# Patient Record
Sex: FEMALE | Race: WHITE | NOT HISPANIC OR LATINO | Employment: OTHER | RURAL
[De-identification: names, ages, dates, MRNs, and addresses within clinical notes are randomized per-mention and may not be internally consistent; named-entity substitution may affect disease eponyms.]

---

## 2019-02-19 ENCOUNTER — CLINICAL SUPPORT (OUTPATIENT)
Dept: FAMILY MEDICINE CLINIC | Facility: CLINIC | Age: 69
End: 2019-02-19

## 2019-02-19 DIAGNOSIS — E78.5 HYPERLIPIDEMIA, UNSPECIFIED HYPERLIPIDEMIA TYPE: Primary | ICD-10-CM

## 2019-02-27 ENCOUNTER — TELEPHONE (OUTPATIENT)
Dept: FAMILY MEDICINE CLINIC | Facility: CLINIC | Age: 69
End: 2019-02-27

## 2019-03-05 ENCOUNTER — OFFICE VISIT (OUTPATIENT)
Dept: FAMILY MEDICINE CLINIC | Facility: CLINIC | Age: 69
End: 2019-03-05

## 2019-03-05 VITALS
OXYGEN SATURATION: 97 % | BODY MASS INDEX: 29.85 KG/M2 | RESPIRATION RATE: 18 BRPM | DIASTOLIC BLOOD PRESSURE: 87 MMHG | HEART RATE: 84 BPM | WEIGHT: 179.2 LBS | SYSTOLIC BLOOD PRESSURE: 142 MMHG | HEIGHT: 65 IN | TEMPERATURE: 96.8 F

## 2019-03-05 DIAGNOSIS — H01.005 BLEPHARITIS OF LEFT LOWER EYELID, UNSPECIFIED TYPE: ICD-10-CM

## 2019-03-05 DIAGNOSIS — H10.13 ALLERGIC CONJUNCTIVITIS OF BOTH EYES: ICD-10-CM

## 2019-03-05 DIAGNOSIS — E78.5 HYPERLIPIDEMIA WITH TARGET LDL LESS THAN 100: Primary | ICD-10-CM

## 2019-03-05 PROCEDURE — 99213 OFFICE O/P EST LOW 20 MIN: CPT | Performed by: NURSE PRACTITIONER

## 2019-03-05 RX ORDER — FLUTICASONE PROPIONATE 50 MCG
2 SPRAY, SUSPENSION (ML) NASAL AS NEEDED
COMMUNITY

## 2019-03-05 RX ORDER — IBUPROFEN 200 MG
200 TABLET ORAL EVERY 6 HOURS PRN
COMMUNITY

## 2019-03-05 RX ORDER — OLOPATADINE HYDROCHLORIDE 1 MG/ML
1 SOLUTION/ DROPS OPHTHALMIC 2 TIMES DAILY
Qty: 5 ML | Refills: 5 | Status: SHIPPED | OUTPATIENT
Start: 2019-03-05 | End: 2019-03-08 | Stop reason: ALTCHOICE

## 2019-03-05 NOTE — PROGRESS NOTES
Chief Complaint   Patient presents with   • Hyperlipidemia     Follow-up - Labs        Subjective   Aide Maxwell is a 68 y.o.  female who presents today for follow up cholesterol level.    HPI:  Patient presents today to discuss her recent lipid panel.  She states she has been feeling well except for seasonal allergy symptoms.  She has been treated for same over the past few years with Bepreve.  She states it can be a little too strong at times. Otherwise, she has no complaints.    Aide Maxwell  has a past medical history of Allergic.    No Known Allergies    Current Outpatient Medications:   •  Fexofenadine HCl (ALLEGRA ALLERGY PO), Take 1 tablet by mouth As Needed., Disp: , Rfl:   •  fluticasone (FLONASE) 50 MCG/ACT nasal spray, 2 sprays into the nostril(s) as directed by provider As Needed for Rhinitis., Disp: , Rfl:   •  ibuprofen (ADVIL,MOTRIN) 200 MG tablet, Take 200 mg by mouth Every 6 (Six) Hours As Needed for Mild Pain ., Disp: , Rfl:   Past Medical History:   Diagnosis Date   • Allergic      Past Surgical History:   Procedure Laterality Date   • BREAST LUMPECTOMY Right    • KNEE SURGERY Left    • THYROIDECTOMY, PARTIAL Right    • TUBAL ABDOMINAL LIGATION       Social History     Socioeconomic History   • Marital status:      Spouse name: Not on file   • Number of children: Not on file   • Years of education: Not on file   • Highest education level: Not on file   Tobacco Use   • Smoking status: Never Smoker   • Smokeless tobacco: Never Used   Substance and Sexual Activity   • Alcohol use: Yes     Comment: socially.   • Drug use: No   • Sexual activity: Defer     Family History   Problem Relation Age of Onset   • Dementia Mother    • Heart disease Father    • Leukemia Brother    • Prostate cancer Brother        Family history, surgical history, past medical history, Allergies and med's reviewed with patient today and updated in [facility] EMR.     ROS:  Review of Systems   Constitutional: Negative.   "  HENT: Negative.    Eyes: Positive for itching.   Respiratory: Negative.    Cardiovascular: Negative.    Gastrointestinal: Negative.    Endocrine: Negative.    Genitourinary: Negative.    Musculoskeletal: Negative.    Skin: Negative.    Allergic/Immunologic: Positive for environmental allergies.   Neurological: Negative.    Hematological: Negative.    Psychiatric/Behavioral: Negative.        OBJECTIVE:  Vitals:    03/05/19 1332   BP: 142/87   BP Location: Left arm   Patient Position: Sitting   Cuff Size: Adult   Pulse: 84   Resp: 18   Temp: 96.8 °F (36 °C)   TempSrc: Tympanic   SpO2: 97%   Weight: 81.3 kg (179 lb 3.2 oz)   Height: 165.1 cm (65\")     Physical Exam   Constitutional: She is oriented to person, place, and time. She appears well-developed and well-nourished.   HENT:   Head: Normocephalic and atraumatic.   Right Ear: External ear normal.   Left Ear: External ear normal.   Nose: Nose normal.   Mouth/Throat: Oropharynx is clear and moist.   Eyes: EOM are normal. Pupils are equal, round, and reactive to light.   Mild conjunctival injection with watery tearing.  Irritation of the left inner blephari   Neck: Normal range of motion. Neck supple.   Cardiovascular: Normal rate, regular rhythm, normal heart sounds and intact distal pulses.   Pulmonary/Chest: Effort normal and breath sounds normal.   Abdominal: Soft. Bowel sounds are normal.   Musculoskeletal: Normal range of motion.   Neurological: She is alert and oriented to person, place, and time.   Skin: Skin is warm and dry. Capillary refill takes less than 2 seconds.   Psychiatric: She has a normal mood and affect. Her behavior is normal. Judgment and thought content normal.   Nursing note and vitals reviewed.      ASSESSMENT/ PLAN:    Aide was seen today for hyperlipidemia.    Diagnoses and all orders for this visit:    Hyperlipidemia with target LDL less than 100    Blepharitis of left lower eyelid, unspecified type  -     Neomycin-Polymyxin-Dexameth 0.1 " % ointment; Apply 1 application to eye(s) as directed by provider 2 (Two) Times a Day As Needed (eyelid irritation).    Allergic conjunctivitis of both eyes  -     olopatadine (PATANOL) 0.1 % ophthalmic solution; Administer 1 drop to both eyes 2 (Two) Times a Day.        Orders Placed Today:     New Medications Ordered This Visit   Medications   • olopatadine (PATANOL) 0.1 % ophthalmic solution     Sig: Administer 1 drop to both eyes 2 (Two) Times a Day.     Dispense:  5 mL     Refill:  5   • Neomycin-Polymyxin-Dexameth 0.1 % ointment     Sig: Apply 1 application to eye(s) as directed by provider 2 (Two) Times a Day As Needed (eyelid irritation).     Dispense:  3.5 g     Refill:  5        Management Plan:     An After Visit Summary was printed and given to the patient at discharge.    Follow-up: Return in about 6 months (around 9/5/2019) for Annual physical with labs prior.    ALEXIS Hunter 3/5/2019 2:27 PM  This note was electronically signed.

## 2019-03-08 RX ORDER — OLOPATADINE HYDROCHLORIDE 2 MG/ML
1 SOLUTION/ DROPS OPHTHALMIC DAILY
Qty: 2.5 ML | Refills: 1 | Status: SHIPPED | OUTPATIENT
Start: 2019-03-08 | End: 2020-08-21

## 2019-07-23 ENCOUNTER — TELEPHONE (OUTPATIENT)
Dept: FAMILY MEDICINE CLINIC | Facility: CLINIC | Age: 69
End: 2019-07-23

## 2019-07-23 DIAGNOSIS — H01.005 BLEPHARITIS OF LEFT LOWER EYELID, UNSPECIFIED TYPE: ICD-10-CM

## 2019-07-23 NOTE — TELEPHONE ENCOUNTER
Pt called regarding eczema outbreak. She is requesting cream be sent to [facility] for this. Please call.

## 2019-07-25 RX ORDER — TRIAMCINOLONE ACETONIDE 1 MG/G
CREAM TOPICAL 2 TIMES DAILY
Qty: 45 G | Refills: 1 | Status: SHIPPED | OUTPATIENT
Start: 2019-07-25 | End: 2020-08-21

## 2019-08-06 ENCOUNTER — CLINICAL SUPPORT (OUTPATIENT)
Dept: FAMILY MEDICINE CLINIC | Facility: CLINIC | Age: 69
End: 2019-08-06

## 2019-08-06 DIAGNOSIS — E78.5 HYPERLIPIDEMIA WITH TARGET LDL LESS THAN 100: ICD-10-CM

## 2019-08-06 DIAGNOSIS — Z00.00 MEDICARE ANNUAL WELLNESS VISIT, SUBSEQUENT: Primary | ICD-10-CM

## 2019-08-09 ENCOUNTER — OFFICE VISIT (OUTPATIENT)
Dept: FAMILY MEDICINE CLINIC | Facility: CLINIC | Age: 69
End: 2019-08-09

## 2019-08-09 VITALS
WEIGHT: 177.2 LBS | SYSTOLIC BLOOD PRESSURE: 147 MMHG | HEART RATE: 90 BPM | DIASTOLIC BLOOD PRESSURE: 91 MMHG | HEIGHT: 65 IN | BODY MASS INDEX: 29.52 KG/M2 | RESPIRATION RATE: 14 BRPM

## 2019-08-09 DIAGNOSIS — R03.0 SITUATIONAL HYPERTENSION: ICD-10-CM

## 2019-08-09 DIAGNOSIS — Z12.31 ENCOUNTER FOR SCREENING MAMMOGRAM FOR BREAST CANCER: ICD-10-CM

## 2019-08-09 DIAGNOSIS — Z12.11 COLON CANCER SCREENING: ICD-10-CM

## 2019-08-09 DIAGNOSIS — Z71.3 ENCOUNTER FOR NUTRITIONAL COUNSELING: ICD-10-CM

## 2019-08-09 DIAGNOSIS — H53.9 VISUAL CHANGES: ICD-10-CM

## 2019-08-09 DIAGNOSIS — Z00.00 ENCOUNTER FOR MEDICARE ANNUAL WELLNESS EXAM: Primary | ICD-10-CM

## 2019-08-09 DIAGNOSIS — L30.9 ECZEMA, UNSPECIFIED TYPE: ICD-10-CM

## 2019-08-09 DIAGNOSIS — Z78.0 POST-MENOPAUSAL: ICD-10-CM

## 2019-08-09 PROCEDURE — G0439 PPPS, SUBSEQ VISIT: HCPCS | Performed by: NURSE PRACTITIONER

## 2019-08-09 RX ORDER — HYDROCORTISONE VALERATE CREAM 2 MG/G
CREAM TOPICAL 2 TIMES DAILY
Qty: 15 G | Refills: 2 | Status: SHIPPED | OUTPATIENT
Start: 2019-08-09 | End: 2020-08-21 | Stop reason: SDUPTHER

## 2019-08-09 NOTE — PROGRESS NOTES
The ABCs of the Annual Wellness Visit  Subsequent Medicare Wellness Visit    Chief Complaint   Patient presents with   • Medicare Wellness-subsequent       Subjective   History of Present Illness:  Aide Maxwell is a 68 y.o. female who presents for a Subsequent Medicare Wellness Visit.    HEALTH RISK ASSESSMENT    Recent Hospitalizations:  No hospitalization(s) within the last year.    Current Medical Providers:  Patient Care Team:  Brent Lopez MD as PCP - General (Family Medicine)    Smoking Status:  Social History     Tobacco Use   Smoking Status Former Smoker   Smokeless Tobacco Never Used       Alcohol Consumption:  Social History     Substance and Sexual Activity   Alcohol Use Yes    Comment: socially.       Depression Screen:   PHQ-2/PHQ-9 Depression Screening 8/9/2019   Little interest or pleasure in doing things 0   Feeling down, depressed, or hopeless 0   Trouble falling or staying asleep, or sleeping too much 0   Feeling tired or having little energy 0   Poor appetite or overeating 0   Feeling bad about yourself - or that you are a failure or have let yourself or your family down 0   Trouble concentrating on things, such as reading the newspaper or watching television 0   Moving or speaking so slowly that other people could have noticed. Or the opposite - being so fidgety or restless that you have been moving around a lot more than usual 0   Thoughts that you would be better off dead, or of hurting yourself in some way 0   Total Score 0       Fall Risk Screen:  STEADI Fall Risk Assessment was completed, and patient is at LOW risk for falls.Assessment completed on:8/9/2019    Health Habits and Functional and Cognitive Screening:  Functional & Cognitive Status 8/9/2019   Do you have difficulty preparing food and eating? No   Do you have difficulty bathing yourself, getting dressed or grooming yourself? No   Do you have difficulty using the toilet? No   Do you have difficulty moving around from place to  place? No   Do you have trouble with steps or getting out of a bed or a chair? No   Current Diet Well Balanced Diet   Dental Exam Up to date   Eye Exam Not up to date   Exercise (times per week) 7 times per week   Current Exercise Activities Include Walking   Do you need help using the phone?  No   Are you deaf or do you have serious difficulty hearing?  No   Do you need help with transportation? No   Do you need help shopping? No   Do you need help preparing meals?  No   Do you need help with housework?  No   Do you need help with laundry? No   Do you need help taking your medications? No   Do you need help managing money? No   Do you ever drive or ride in a car without wearing a seat belt? No   Have you felt unusual stress, anger or loneliness in the last month? No   Who do you live with? Spouse   If you need help, do you have trouble finding someone available to you? No   Have you been bothered in the last four weeks by sexual problems? No   Do you have difficulty concentrating, remembering or making decisions? No         Does the patient have evidence of cognitive impairment? No    Asprin use counseling:Does not need ASA (and currently is not on it)    Age-appropriate Screening Schedule:  Refer to the list below for future screening recommendations based on patient's age, sex and/or medical conditions. Orders for these recommended tests are listed in the plan section. The patient has been provided with a written plan.    Health Maintenance   Topic Date Due   • INFLUENZA VACCINE  08/01/2019   • ZOSTER VACCINE (1 of 2) 08/09/2020 (Originally 10/30/1999)   • PNEUMOCOCCAL VACCINES (65+ LOW/MEDIUM RISK) (1 of 2 - PCV13) 10/01/2023 (Originally 10/30/2014)   • LIPID PANEL  08/06/2020   • COLONOSCOPY  08/09/2029   • TDAP/TD VACCINES  Discontinued          The following portions of the patient's history were reviewed and updated as appropriate: allergies, current medications, past family history, past medical history,  past social history, past surgical history and problem list.    Outpatient Medications Prior to Visit   Medication Sig Dispense Refill   • Fexofenadine HCl (ALLEGRA ALLERGY PO) Take 1 tablet by mouth As Needed.     • fluticasone (FLONASE) 50 MCG/ACT nasal spray 2 sprays into the nostril(s) as directed by provider As Needed for Rhinitis.     • ibuprofen (ADVIL,MOTRIN) 200 MG tablet Take 200 mg by mouth Every 6 (Six) Hours As Needed for Mild Pain .     • olopatadine (PATADAY) 0.2 % solution ophthalmic solution Administer 1 drop to both eyes Daily. 2.5 mL 1   • triamcinolone (KENALOG) 0.1 % cream Apply  topically to the appropriate area as directed 2 (Two) Times a Day. 45 g 1   • Neomycin-Polymyxin-Dexameth 0.1 % ointment Apply 1 application to eye(s) as directed by provider 2 (Two) Times a Day As Needed (eyelid irritation). 3.5 g 5     No facility-administered medications prior to visit.        There is no problem list on file for this patient.      Advanced Care Planning:  Patient does not have an advance directive - not interested in additional information    Review of Systems   Constitutional: Negative.  Negative for fatigue, fever and unexpected weight change.   HENT: Negative.  Negative for facial swelling, sore throat and trouble swallowing.    Eyes: Negative.  Negative for photophobia, discharge and visual disturbance.   Respiratory: Negative.  Negative for cough, chest tightness and shortness of breath.    Cardiovascular: Negative.  Negative for chest pain and palpitations.   Gastrointestinal: Negative.  Negative for abdominal pain, diarrhea, nausea and vomiting.   Endocrine: Negative.  Negative for polydipsia, polyphagia and polyuria.   Genitourinary: Negative.  Negative for dysuria, flank pain and frequency.   Musculoskeletal: Negative.  Negative for back pain, gait problem and neck pain.   Skin: Negative.  Negative for rash.   Allergic/Immunologic: Negative.    Neurological: Negative.  Negative for  "dizziness, light-headedness and headaches.   Hematological: Negative.    Psychiatric/Behavioral: Negative.  Negative for self-injury and suicidal ideas.       Compared to one year ago, the patient feels her physical health is the same.  Compared to one year ago, the patient feels her mental health is the same.    Reviewed chart for potential of high risk medication in the elderly: not applicable  Reviewed chart for potential of harmful drug interactions in the elderly:not applicable    Objective         Vitals:    08/09/19 1038   BP: 147/91   BP Location: Right arm   Patient Position: Sitting   Cuff Size: Adult   Pulse: 90   Resp: 14   Weight: 80.4 kg (177 lb 3.2 oz)   Height: 165.1 cm (65\")   PainSc: 0-No pain       Body mass index is 29.49 kg/m².  Discussed the patient's BMI with her. The BMI is above average; BMI management plan is completed.    Physical Exam   Constitutional: She is oriented to person, place, and time. She appears well-developed and well-nourished. No distress.   HENT:   Head: Normocephalic and atraumatic.   Right Ear: External ear normal.   Left Ear: External ear normal.   Nose: Nose normal.   Mouth/Throat: Oropharynx is clear and moist.   Eyes: Conjunctivae and EOM are normal. Pupils are equal, round, and reactive to light.   Neck: Normal range of motion. Neck supple.   Cardiovascular: Normal rate, regular rhythm, normal heart sounds and intact distal pulses.   No murmur heard.  Pulmonary/Chest: Effort normal and breath sounds normal. No respiratory distress.   Abdominal: Soft. Bowel sounds are normal. She exhibits no distension. There is no tenderness.   Genitourinary:   Genitourinary Comments: Deferred   Musculoskeletal: Normal range of motion. She exhibits no edema.   Neurological: She is alert and oriented to person, place, and time.   Skin: Skin is warm and dry. Capillary refill takes less than 2 seconds. She is not diaphoretic. No erythema.   Psychiatric: She has a normal mood and " affect. Her behavior is normal. Judgment and thought content normal.   Nursing note and vitals reviewed.          Assessment/Plan   Medicare Risks and Personalized Health Plan  CMS Preventative Services Quick Reference  Advance Directive Discussion  Breast Cancer/Mammogram Screening  Cardiovascular risk  Colon Cancer Screening  Diabetic Lab Screening   Immunizations Discussed/Encouraged (specific immunizations; Shingrix )  Obesity/Overweight   Osteoprorosis Risk    The above risks/problems have been discussed with the patient.  Pertinent information has been shared with the patient in the After Visit Summary.  Follow up plans and orders are seen below in the Assessment/Plan Section.    Diagnoses and all orders for this visit:    1. Encounter for Medicare annual wellness exam (Primary)    2. Encounter for screening mammogram for breast cancer  -     Mammo Screening Bilateral With CAD; Future    3. Colon cancer screening  -     Cologuard - Stool, Per Rectum; Future    4. Situational hypertension    5. Post-menopausal  -     DEXA Bone Density Axial; Future    6. Eczema, unspecified type  -     hydrocortisone (WESTCORT) 0.2 % cream; Apply  topically to the appropriate area as directed 2 (Two) Times a Day.  Dispense: 15 g; Refill: 2    7. Visual changes  -     Ambulatory Referral to Ophthalmology    8. Encounter for nutritional counseling    Other orders  -     Shingrix Vaccine; Future      Follow Up:  Return in about 1 year (around 8/9/2020) for Annual physical with labs prior.     An After Visit Summary and PPPS were given to the patient.

## 2019-08-27 ENCOUNTER — HOSPITAL ENCOUNTER (OUTPATIENT)
Dept: BONE DENSITY | Facility: HOSPITAL | Age: 69
Discharge: HOME | End: 2019-08-27

## 2019-08-27 ENCOUNTER — HOSPITAL ENCOUNTER (OUTPATIENT)
Dept: MAMMOGRAPHY | Facility: HOSPITAL | Age: 69
Discharge: HOME | End: 2019-08-27
Admitting: NURSE PRACTITIONER

## 2019-08-27 DIAGNOSIS — Z78.0 POST-MENOPAUSAL: ICD-10-CM

## 2019-08-27 DIAGNOSIS — Z12.31 ENCOUNTER FOR SCREENING MAMMOGRAM FOR BREAST CANCER: ICD-10-CM

## 2019-08-27 PROCEDURE — 77063 BREAST TOMOSYNTHESIS BI: CPT

## 2019-08-27 PROCEDURE — 77067 SCR MAMMO BI INCL CAD: CPT

## 2019-08-27 PROCEDURE — 77080 DXA BONE DENSITY AXIAL: CPT

## 2019-09-09 ENCOUNTER — RESULTS ENCOUNTER (OUTPATIENT)
Dept: FAMILY MEDICINE CLINIC | Facility: CLINIC | Age: 69
End: 2019-09-09

## 2019-09-09 DIAGNOSIS — Z12.11 COLON CANCER SCREENING: ICD-10-CM

## 2020-08-17 ENCOUNTER — TELEPHONE (OUTPATIENT)
Dept: FAMILY MEDICINE CLINIC | Facility: CLINIC | Age: 70
End: 2020-08-17

## 2020-08-17 ENCOUNTER — TRANSCRIBE ORDERS (OUTPATIENT)
Dept: ADMINISTRATIVE | Facility: HOSPITAL | Age: 70
End: 2020-08-17

## 2020-08-17 DIAGNOSIS — Z72.51 HISTORY OF UNPROTECTED SEX: ICD-10-CM

## 2020-08-17 DIAGNOSIS — Z12.31 SCREENING MAMMOGRAM, ENCOUNTER FOR: Primary | ICD-10-CM

## 2020-08-17 DIAGNOSIS — Z00.00 ENCOUNTER FOR MEDICARE ANNUAL WELLNESS EXAM: Primary | ICD-10-CM

## 2020-08-17 DIAGNOSIS — R53.83 FATIGUE, UNSPECIFIED TYPE: ICD-10-CM

## 2020-08-17 DIAGNOSIS — E78.5 HYPERLIPIDEMIA WITH TARGET LDL LESS THAN 100: ICD-10-CM

## 2020-08-17 DIAGNOSIS — R03.0 SITUATIONAL HYPERTENSION: ICD-10-CM

## 2020-08-17 NOTE — TELEPHONE ENCOUNTER
Patient called and advised that she has a wellness visit scheduled on 08/21/20.      Patient called and wanted to request for labs to be completed.     Please contact once orders written @ [phone].

## 2020-08-17 NOTE — TELEPHONE ENCOUNTER
Spoke with patient and she stated she would be coming in the office tomorrow around 8-8:30 to have labs completed for wellness. Please add to the nurse schedule.       Orders have been placed.

## 2020-08-18 ENCOUNTER — RESULTS ENCOUNTER (OUTPATIENT)
Dept: FAMILY MEDICINE CLINIC | Facility: CLINIC | Age: 70
End: 2020-08-18

## 2020-08-18 ENCOUNTER — CLINICAL SUPPORT (OUTPATIENT)
Dept: FAMILY MEDICINE CLINIC | Facility: CLINIC | Age: 70
End: 2020-08-18

## 2020-08-18 DIAGNOSIS — Z00.00 ENCOUNTER FOR MEDICARE ANNUAL WELLNESS EXAM: ICD-10-CM

## 2020-08-18 DIAGNOSIS — E78.5 HYPERLIPIDEMIA WITH TARGET LDL LESS THAN 100: ICD-10-CM

## 2020-08-18 DIAGNOSIS — R03.0 SITUATIONAL HYPERTENSION: ICD-10-CM

## 2020-08-18 DIAGNOSIS — R53.83 FATIGUE, UNSPECIFIED TYPE: ICD-10-CM

## 2020-08-18 DIAGNOSIS — Z72.51 HISTORY OF UNPROTECTED SEX: ICD-10-CM

## 2020-08-19 LAB
ALBUMIN SERPL-MCNC: 4.5 G/DL (ref 3.8–4.8)
ALBUMIN/GLOB SERPL: 2.3 {RATIO} (ref 1.2–2.2)
ALP SERPL-CCNC: 66 IU/L (ref 39–117)
ALT SERPL-CCNC: 11 IU/L (ref 0–32)
AST SERPL-CCNC: 27 IU/L (ref 0–40)
BASOPHILS # BLD AUTO: 0 X10E3/UL (ref 0–0.2)
BASOPHILS NFR BLD AUTO: 1 %
BILIRUB SERPL-MCNC: 0.4 MG/DL (ref 0–1.2)
BUN SERPL-MCNC: 16 MG/DL (ref 8–27)
BUN/CREAT SERPL: 21 (ref 12–28)
CALCIUM SERPL-MCNC: 9.6 MG/DL (ref 8.7–10.3)
CHLORIDE SERPL-SCNC: 103 MMOL/L (ref 96–106)
CHOLEST SERPL-MCNC: 216 MG/DL (ref 100–199)
CO2 SERPL-SCNC: 27 MMOL/L (ref 20–29)
CREAT SERPL-MCNC: 0.75 MG/DL (ref 0.57–1)
EOSINOPHIL # BLD AUTO: 0.2 X10E3/UL (ref 0–0.4)
EOSINOPHIL NFR BLD AUTO: 6 %
ERYTHROCYTE [DISTWIDTH] IN BLOOD BY AUTOMATED COUNT: 12.8 % (ref 11.7–15.4)
GLOBULIN SER CALC-MCNC: 2 G/DL (ref 1.5–4.5)
GLUCOSE SERPL-MCNC: 95 MG/DL (ref 65–99)
HCT VFR BLD AUTO: 40.5 % (ref 34–46.6)
HCV AB S/CO SERPL IA: 0.1 S/CO RATIO (ref 0–0.9)
HDLC SERPL-MCNC: 59 MG/DL
HGB BLD-MCNC: 13.6 G/DL (ref 11.1–15.9)
IMM GRANULOCYTES # BLD AUTO: 0 X10E3/UL (ref 0–0.1)
IMM GRANULOCYTES NFR BLD AUTO: 1 %
LDLC SERPL CALC-MCNC: 136 MG/DL (ref 0–99)
LDLC/HDLC SERPL: 2.3 RATIO (ref 0–3.2)
LYMPHOCYTES # BLD AUTO: 1.1 X10E3/UL (ref 0.7–3.1)
LYMPHOCYTES NFR BLD AUTO: 34 %
MCH RBC QN AUTO: 30.9 PG (ref 26.6–33)
MCHC RBC AUTO-ENTMCNC: 33.6 G/DL (ref 31.5–35.7)
MCV RBC AUTO: 92 FL (ref 79–97)
MONOCYTES # BLD AUTO: 0.3 X10E3/UL (ref 0.1–0.9)
MONOCYTES NFR BLD AUTO: 9 %
NEUTROPHILS # BLD AUTO: 1.7 X10E3/UL (ref 1.4–7)
NEUTROPHILS NFR BLD AUTO: 49 %
PLATELET # BLD AUTO: 179 X10E3/UL (ref 150–450)
POTASSIUM SERPL-SCNC: 4.4 MMOL/L (ref 3.5–5.2)
PROT SERPL-MCNC: 6.5 G/DL (ref 6–8.5)
RBC # BLD AUTO: 4.4 X10E6/UL (ref 3.77–5.28)
SODIUM SERPL-SCNC: 144 MMOL/L (ref 134–144)
T4 SERPL-MCNC: 6.2 UG/DL (ref 4.5–12)
TRIGL SERPL-MCNC: 103 MG/DL (ref 0–149)
TSH SERPL DL<=0.005 MIU/L-ACNC: 2.98 UIU/ML (ref 0.45–4.5)
VLDLC SERPL CALC-MCNC: 21 MG/DL (ref 5–40)
WBC # BLD AUTO: 3.3 X10E3/UL (ref 3.4–10.8)

## 2020-08-21 ENCOUNTER — OFFICE VISIT (OUTPATIENT)
Dept: FAMILY MEDICINE CLINIC | Facility: CLINIC | Age: 70
End: 2020-08-21

## 2020-08-21 VITALS
SYSTOLIC BLOOD PRESSURE: 133 MMHG | TEMPERATURE: 97.7 F | DIASTOLIC BLOOD PRESSURE: 88 MMHG | HEIGHT: 65 IN | BODY MASS INDEX: 29.59 KG/M2 | HEART RATE: 80 BPM | WEIGHT: 177.6 LBS | OXYGEN SATURATION: 97 %

## 2020-08-21 DIAGNOSIS — Z00.00 ENCOUNTER FOR MEDICARE ANNUAL WELLNESS EXAM: Primary | ICD-10-CM

## 2020-08-21 DIAGNOSIS — Z78.0 POST-MENOPAUSAL: ICD-10-CM

## 2020-08-21 DIAGNOSIS — E78.5 HYPERLIPIDEMIA WITH TARGET LDL LESS THAN 100: ICD-10-CM

## 2020-08-21 DIAGNOSIS — L30.9 ECZEMA, UNSPECIFIED TYPE: ICD-10-CM

## 2020-08-21 DIAGNOSIS — Z12.31 ENCOUNTER FOR SCREENING MAMMOGRAM FOR BREAST CANCER: ICD-10-CM

## 2020-08-21 PROBLEM — M17.12 PRIMARY OSTEOARTHRITIS OF LEFT KNEE: Status: ACTIVE | Noted: 2020-05-13

## 2020-08-21 PROCEDURE — G0439 PPPS, SUBSEQ VISIT: HCPCS | Performed by: NURSE PRACTITIONER

## 2020-08-21 RX ORDER — HYDROCORTISONE VALERATE CREAM 2 MG/G
CREAM TOPICAL 2 TIMES DAILY
Qty: 60 G | Refills: 2 | Status: SHIPPED | OUTPATIENT
Start: 2020-08-21

## 2020-08-21 NOTE — PROGRESS NOTES
The ABCs of the Annual Wellness Visit  Subsequent Medicare Wellness Visit    Chief Complaint   Patient presents with   • Medicare Wellness-subsequent       Subjective   History of Present Illness:    Aide Maxwell is a 69 y.o. female who presents for a Subsequent Medicare Wellness Visit.    PATIENT CONCERNS:  Patient has some left knee pain.  She saw her orthopedic specialist in [place] when she was home.  It is a chronic condition.    HEALTH RISK ASSESSMENT    Recent Hospitalizations:  No hospitalization(s) within the last year.    Current Medical Providers:  Patient Care Team:  Brent Lopez MD as PCP - General (Family Medicine)    Smoking Status:  Social History     Tobacco Use   Smoking Status Former Smoker   • Packs/day: 0.25   • Years: 20.00   • Pack years: 5.00   • Types: Cigarettes   • Last attempt to quit:    • Years since quittin.6   Smokeless Tobacco Never Used       Alcohol Consumption:  Social History     Substance and Sexual Activity   Alcohol Use Yes    Comment: socially.       Depression Screen:   PHQ-2/PHQ-9 Depression Screening 2020   Little interest or pleasure in doing things 0   Feeling down, depressed, or hopeless 0   Trouble falling or staying asleep, or sleeping too much -   Feeling tired or having little energy -   Poor appetite or overeating -   Feeling bad about yourself - or that you are a failure or have let yourself or your family down -   Trouble concentrating on things, such as reading the newspaper or watching television -   Moving or speaking so slowly that other people could have noticed. Or the opposite - being so fidgety or restless that you have been moving around a lot more than usual -   Thoughts that you would be better off dead, or of hurting yourself in some way -   Total Score 0       Fall Risk Screen:  STEADI Fall Risk Assessment was completed, and patient is at LOW risk for falls.Assessment completed on:2020    Health Habits and Functional and  Cognitive Screening:  Functional & Cognitive Status 8/21/2020   Do you have difficulty preparing food and eating? No   Do you have difficulty bathing yourself, getting dressed or grooming yourself? No   Do you have difficulty using the toilet? No   Do you have difficulty moving around from place to place? No   Do you have trouble with steps or getting out of a bed or a chair? No   Current Diet Well Balanced Diet   Dental Exam Up to date   Eye Exam Not up to date   Exercise (times per week) 0 times per week   Current Exercise Activities Include None   Do you need help using the phone?  No   Are you deaf or do you have serious difficulty hearing?  No   Do you need help with transportation? No   Do you need help shopping? No   Do you need help preparing meals?  No   Do you need help with housework?  No   Do you need help with laundry? No   Do you need help taking your medications? No   Do you need help managing money? No   Do you ever drive or ride in a car without wearing a seat belt? No   Have you felt unusual stress, anger or loneliness in the last month? No   Who do you live with? Spouse   If you need help, do you have trouble finding someone available to you? No   Have you been bothered in the last four weeks by sexual problems? No   Do you have difficulty concentrating, remembering or making decisions? No         Does the patient have evidence of cognitive impairment? No    Asprin use counseling:Does not need ASA (and currently is not on it)    Age-appropriate Screening Schedule:  Refer to the list below for future screening recommendations based on patient's age, sex and/or medical conditions. Orders for these recommended tests are listed in the plan section. The patient has been provided with a written plan.    Health Maintenance   Topic Date Due   • INFLUENZA VACCINE  10/18/2020 (Originally 8/1/2020)   • LIPID PANEL  08/18/2021   • MAMMOGRAM  08/27/2021   • COLONOSCOPY  08/09/2029   • ZOSTER VACCINE  Completed    • TDAP/TD VACCINES  Discontinued          The following portions of the patient's history were reviewed and updated as appropriate: allergies, current medications, past family history, past medical history, past social history, past surgical history and problem list.    Outpatient Medications Prior to Visit   Medication Sig Dispense Refill   • Fexofenadine HCl (ALLEGRA ALLERGY PO) Take 1 tablet by mouth As Needed.     • fluticasone (FLONASE) 50 MCG/ACT nasal spray 2 sprays into the nostril(s) as directed by provider As Needed for Rhinitis.     • ibuprofen (ADVIL,MOTRIN) 200 MG tablet Take 200 mg by mouth Every 6 (Six) Hours As Needed for Mild Pain .     • hydrocortisone (WESTCORT) 0.2 % cream Apply  topically to the appropriate area as directed 2 (Two) Times a Day. 15 g 2   • olopatadine (PATADAY) 0.2 % solution ophthalmic solution Administer 1 drop to both eyes Daily. 2.5 mL 1   • triamcinolone (KENALOG) 0.1 % cream Apply  topically to the appropriate area as directed 2 (Two) Times a Day. 45 g 1     No facility-administered medications prior to visit.        Patient Active Problem List   Diagnosis   • Essential hypertension   • History of thyroid nodule   • Hypercalcemia   • Personal history of skin cancer   • Primary osteoarthritis of left knee   • Vitamin D deficiency       Advanced Care Planning:  ACP discussion was declined by the patient. Patient has an advance directive (not in EMR), copy requested.    Review of Systems   Constitutional: Negative.  Negative for fatigue, fever and unexpected weight change.   HENT: Negative.  Negative for facial swelling, sore throat and trouble swallowing.    Eyes: Negative.  Negative for photophobia, discharge and visual disturbance.   Respiratory: Negative.  Negative for cough, chest tightness and shortness of breath.    Cardiovascular: Negative.  Negative for chest pain and palpitations.   Gastrointestinal: Negative.  Negative for abdominal pain, diarrhea, nausea and  "vomiting.   Endocrine: Negative.  Negative for polydipsia, polyphagia and polyuria.   Genitourinary: Negative.  Negative for dysuria, flank pain and frequency.   Musculoskeletal: Positive for arthralgias. Negative for back pain, gait problem and neck pain.        Left knee pain, chronic.   Skin: Negative.  Negative for rash.   Allergic/Immunologic: Negative.    Neurological: Negative.  Negative for dizziness, light-headedness and headaches.   Hematological: Negative.    Psychiatric/Behavioral: Negative.  Negative for self-injury and suicidal ideas.       Compared to one year ago, the patient feels her physical health is the same.  Compared to one year ago, the patient feels her mental health is the same.    Reviewed chart for potential of high risk medication in the elderly: yes  Reviewed chart for potential of harmful drug interactions in the elderly:yes    Objective         Vitals:    08/21/20 1006   BP: 133/88   BP Location: Right arm   Patient Position: Sitting   Cuff Size: Adult   Pulse: 80   Temp: 97.7 °F (36.5 °C)   SpO2: 97%   Weight: 80.6 kg (177 lb 9.6 oz)   Height: 165.1 cm (65\")  Comment: pt reported       Body mass index is 29.55 kg/m².  Discussed the patient's BMI with her. The BMI is above average; no BMI management plan is appropriate..    Physical Exam   Constitutional: She is oriented to person, place, and time. She appears well-developed and well-nourished. No distress.   HENT:   Head: Normocephalic and atraumatic.   Eyes: Pupils are equal, round, and reactive to light. Conjunctivae and EOM are normal.   Neck: Normal range of motion. Neck supple.   Cardiovascular: Normal rate, regular rhythm, normal heart sounds and intact distal pulses.   No murmur heard.  Pulmonary/Chest: Effort normal and breath sounds normal. No respiratory distress.   Abdominal: Soft. Bowel sounds are normal. She exhibits no distension. There is no tenderness.   Musculoskeletal: Normal range of motion. She exhibits no edema. "   Neurological: She is alert and oriented to person, place, and time.   Skin: Skin is warm and dry. Capillary refill takes less than 2 seconds. She is not diaphoretic. No erythema.   Psychiatric: She has a normal mood and affect. Her behavior is normal. Judgment and thought content normal.   Nursing note and vitals reviewed.      Lab Results   Component Value Date    GLU 95 08/18/2020    CHLPL 216 (H) 08/18/2020    TRIG 103 08/18/2020    HDL 59 08/18/2020     (H) 08/18/2020    VLDL 21 08/18/2020        Assessment/Plan   Medicare Risks and Personalized Health Plan  CMS Preventative Services Quick Reference  Advance Directive Discussion  Breast Cancer/Mammogram Screening  Diabetic Lab Screening   Immunizations Discussed/Encouraged (specific immunizations; Influenza )    The above risks/problems have been discussed with the patient.  Pertinent information has been shared with the patient in the After Visit Summary.  Follow up plans and orders are seen below in the Assessment/Plan Section.    Diagnoses and all orders for this visit:    1. Encounter for Medicare annual wellness exam (Primary)    2. Encounter for screening mammogram for breast cancer    3. Eczema, unspecified type  -     hydrocortisone (WESTCORT) 0.2 % cream; Apply  topically to the appropriate area as directed 2 (Two) Times a Day.  Dispense: 60 g; Refill: 2    4. Hyperlipidemia with target LDL less than 100    5. Post-menopausal    Patient is encouraged to continue current health habits of daily exercise (striving for 30 min uninterrupted) and low fat, low carb diet.  Patient encouraged to social distance and use mask when in public or crowded places.    Follow Up:  Return in about 1 year (around 8/21/2021) for Medicare Wellness with labs prior.     An After Visit Summary and PPPS were given to the patient.

## 2020-08-28 ENCOUNTER — HOSPITAL ENCOUNTER (OUTPATIENT)
Dept: MAMMOGRAPHY | Facility: HOSPITAL | Age: 70
Discharge: HOME | End: 2020-08-28
Admitting: NURSE PRACTITIONER

## 2020-08-28 PROCEDURE — 77063 BREAST TOMOSYNTHESIS BI: CPT

## 2020-08-28 PROCEDURE — 77067 SCR MAMMO BI INCL CAD: CPT

## 2020-09-23 DIAGNOSIS — M25.562 ACUTE PAIN OF LEFT KNEE: Primary | ICD-10-CM

## 2020-09-24 ENCOUNTER — OFFICE VISIT (OUTPATIENT)
Dept: ORTHOPEDIC SURGERY | Facility: CLINIC | Age: 70
End: 2020-09-24

## 2020-09-24 VITALS — HEIGHT: 65 IN | WEIGHT: 179 LBS | BODY MASS INDEX: 29.82 KG/M2

## 2020-09-24 DIAGNOSIS — M25.562 ACUTE PAIN OF LEFT KNEE: ICD-10-CM

## 2020-09-24 DIAGNOSIS — I10 ESSENTIAL HYPERTENSION: ICD-10-CM

## 2020-09-24 DIAGNOSIS — M23.92 INTERNAL DERANGEMENT OF LEFT KNEE: Primary | ICD-10-CM

## 2020-09-24 PROCEDURE — 99203 OFFICE O/P NEW LOW 30 MIN: CPT | Performed by: ORTHOPAEDIC SURGERY

## 2020-09-24 RX ORDER — ACETAMINOPHEN 500 MG
500 TABLET ORAL EVERY 6 HOURS PRN
COMMUNITY

## 2020-09-24 RX ORDER — NAPROXEN SODIUM 220 MG
220 TABLET ORAL 2 TIMES DAILY PRN
COMMUNITY

## 2020-09-24 NOTE — PROGRESS NOTES
Aide Maxwell is a 69 y.o. female   Primary provider:  Josseline Garcia APRN       Chief Complaint   Patient presents with   • Left Knee - Pain   • Establish Care       HISTORY OF PRESENT ILLNESS: Patient being seen for left knee pain. X-rays done today. Reports no known injury.   Patient reports pain for approximately 9 months.  She did have a knee scope approximately 10 years ago as well.  She had an injection about 4 months ago in her knee which did seem to help some.  She has not had physical therapy.  She has been taking Tylenol and Aleve with some improvement.  She has pain with pivoting and twisting and pain with prolonged standing and walking.  Worse with activity.  She has a low-grade dull ache but with pivoting and twisting and with deep knee bends that she has a sharp pain.  She has occasional giving out as well.  No fevers or chills.    Pain  This is a new problem. The current episode started more than 1 month ago (9 months). The problem occurs intermittently. Associated symptoms include joint swelling. Pertinent negatives include no chills or fever. Associated symptoms comments: Stabbing, burning, clicking. The symptoms are aggravated by standing and walking.        CONCURRENT MEDICAL HISTORY:    Past Medical History:   Diagnosis Date   • Allergic    • Breast cancer (CMS/[facility]) 2000    right   • Eczema    • Hx of radiation therapy 2000       No Known Allergies      Current Outpatient Medications:   •  acetaminophen (TYLENOL) 500 MG tablet, Take 500 mg by mouth Every 6 (Six) Hours As Needed for Mild Pain ., Disp: , Rfl:   •  Fexofenadine HCl (ALLEGRA ALLERGY PO), Take 1 tablet by mouth As Needed., Disp: , Rfl:   •  fluticasone (FLONASE) 50 MCG/ACT nasal spray, 2 sprays into the nostril(s) as directed by provider As Needed for Rhinitis., Disp: , Rfl:   •  hydrocortisone (WESTCORT) 0.2 % cream, Apply  topically to the appropriate area as directed 2 (Two) Times a Day., Disp: 60 g, Rfl: 2  •  ibuprofen  "(ADVIL,MOTRIN) 200 MG tablet, Take 200 mg by mouth Every 6 (Six) Hours As Needed for Mild Pain ., Disp: , Rfl:   •  naproxen sodium (ALEVE) 220 MG tablet, Take 220 mg by mouth 2 (Two) Times a Day As Needed., Disp: , Rfl:     Past Surgical History:   Procedure Laterality Date   • BREAST BIOPSY Right    • BREAST LUMPECTOMY Right    • KNEE SURGERY Left    • PARATHYROIDECTOMY     • THYROIDECTOMY, PARTIAL Right    • TUBAL ABDOMINAL LIGATION         Family History   Problem Relation Age of Onset   • Dementia Mother    • Heart disease Father    • Leukemia Brother    • Prostate cancer Brother    • Cancer Other    • Hypertension Other    • Breast cancer Neg Hx        Social History     Socioeconomic History   • Marital status:      Spouse name: Not on file   • Number of children: Not on file   • Years of education: Not on file   • Highest education level: Not on file   Tobacco Use   • Smoking status: Former Smoker     Packs/day: 0.25     Years: 20.00     Pack years: 5.00     Types: Cigarettes     Quit date:      Years since quittin.7   • Smokeless tobacco: Never Used   Substance and Sexual Activity   • Alcohol use: Yes     Alcohol/week: 7.0 standard drinks     Types: 7 Cans of beer per week   • Drug use: No   • Sexual activity: Defer        Review of Systems   Constitutional: Negative for chills and fever.   HENT: Positive for postnasal drip.    Respiratory: Negative.    Cardiovascular: Negative.    Musculoskeletal: Positive for joint swelling.   Skin:        hives   All other systems reviewed and are negative.      PHYSICAL EXAMINATION:       Ht 165.1 cm (65\")   Wt 81.2 kg (179 lb)   BMI 29.79 kg/m²     Physical Exam  Constitutional:       General: She is not in acute distress.     Appearance: She is well-developed. She is not ill-appearing.   Pulmonary:      Effort: Pulmonary effort is normal. No respiratory distress.   Musculoskeletal:      Right knee: She exhibits no effusion.   Neurological:    "   Mental Status: She is alert and oriented to person, place, and time.   Psychiatric:         Mood and Affect: Mood normal.         Behavior: Behavior normal.         Thought Content: Thought content normal.         Judgment: Judgment normal.         GAIT:     [x]  Normal  []  Antalgic    Assistive device: [x]  None  []  Walker     []  Crutches  []  Cane     []  Wheelchair  []  Stretcher    Right Knee Exam     Muscle Strength   The patient has normal right knee strength.    Tenderness   The patient is experiencing no tenderness.     Range of Motion   The patient has normal right knee ROM.    Tests   Varus: negative Valgus: negative  Drawer:  Anterior - negative        Other   Erythema: absent  Sensation: normal  Pulse: present  Swelling: none  Effusion: no effusion present      Left Knee Exam     Muscle Strength   The patient has normal left knee strength.    Tenderness   The patient is experiencing tenderness in the medial joint line and medial retinaculum.    Range of Motion   Extension: 0   Flexion: 120     Tests   Velvet:  Medial - positive   Varus: negative Valgus: negative  Drawer:  Anterior - negative         Other   Erythema: absent  Sensation: normal  Pulse: present  Swelling: none                    Xr Knee 1 Or 2 View Left    Result Date: 9/24/2020  Narrative: Ordering Provider:  Jamil Lee MD Ordering Diagnosis/Indication:  Acute pain of left knee Procedure:  XR KNEE 1 OR 2 VW LEFT Exam Date:  9/24/20 COMPARISON:  Not applicable, no relevant images available.     Impression:  AP bilateral standing of the knees with lateral of the left knee shows acceptable position and alignment of both knees with no evidence of acute bony abnormality.  No fracture or dislocation is noted.  There is mild flattening of the medial femoral condyle.  She still has relative maintenance of the joint spacing in both knees.  Mild varus positioning of the left knee.  No acute findings.  No significant  patellofemoral joint arthritic changes noted. Jamil Lee MD 9/24/20     Xr Knee Bilateral Ap Standing    Result Date: 9/24/2020  Narrative: Ordering Provider:  Jamil Lee MD Ordering Diagnosis/Indication:  Acute pain of left knee Procedure:  XR KNEE BILATERAL AP STANDING Exam Date:  9/24/20 COMPARISON:  Not applicable, no relevant images available.     Impression:  AP bilateral standing of the knees with lateral of the left knee shows acceptable position and alignment of both knees with no evidence of acute bony abnormality.  No fracture or dislocation is noted.  There is mild flattening of the medial femoral condyle.  She still has relative maintenance of the joint spacing in both knees.  Mild varus positioning of the left knee.  No acute findings.  No significant patellofemoral joint arthritic changes noted. Jamil Lee MD 9/24/20             ASSESSMENT:    Diagnoses and all orders for this visit:    Internal derangement of left knee  -     MRI Knee Left Without Contrast; Future    Acute pain of left knee  -     MRI Knee Left Without Contrast; Future    Essential hypertension    Other orders  -     naproxen sodium (ALEVE) 220 MG tablet; Take 220 mg by mouth 2 (Two) Times a Day As Needed.  -     acetaminophen (TYLENOL) 500 MG tablet; Take 500 mg by mouth Every 6 (Six) Hours As Needed for Mild Pain .          PLAN    She has minimal arthritic change in the left knee on x-ray.  She has symptoms for approximately 9 months and has a history of prior arthroscopy.  Her symptoms are mechanical in nature with catching and locking, pain with pivoting and twisting, and pain with deep knee bends.  Plan is to proceed with an MRI of her left knee to assess for meniscal tearing.  This will help to determine treatment options including the possibility of arthroscopy of the left knee.  Slowly progress motion and activity as tolerated.  Continue ice for swelling control.  Follow-up after  MRI.    Patient's Body mass index is 29.79 kg/m². BMI is above normal parameters. Recommendations include: exercise counseling and nutrition counseling.      Return for recheck for MRI results.    Jamil Lee MD

## 2020-09-28 ENCOUNTER — FLU SHOT (OUTPATIENT)
Dept: FAMILY MEDICINE CLINIC | Facility: CLINIC | Age: 70
End: 2020-09-28

## 2020-09-28 DIAGNOSIS — Z23 NEED FOR IMMUNIZATION AGAINST INFLUENZA: Primary | ICD-10-CM

## 2020-09-28 PROCEDURE — 90694 VACC AIIV4 NO PRSRV 0.5ML IM: CPT | Performed by: NURSE PRACTITIONER

## 2020-09-28 PROCEDURE — G0008 ADMIN INFLUENZA VIRUS VAC: HCPCS | Performed by: NURSE PRACTITIONER

## 2020-09-28 NOTE — PROGRESS NOTES
Patient was in office and received high dose flu vaccine in Left Deltoid. Patient tolerated well with no reactions.

## 2020-09-29 ENCOUNTER — HOSPITAL ENCOUNTER (OUTPATIENT)
Dept: MRI IMAGING | Facility: HOSPITAL | Age: 70
Discharge: HOME | End: 2020-09-29
Admitting: ORTHOPAEDIC SURGERY

## 2020-09-29 DIAGNOSIS — M25.562 ACUTE PAIN OF LEFT KNEE: ICD-10-CM

## 2020-09-29 DIAGNOSIS — M23.92 INTERNAL DERANGEMENT OF LEFT KNEE: ICD-10-CM

## 2020-09-29 PROCEDURE — 73721 MRI JNT OF LWR EXTRE W/O DYE: CPT

## 2020-10-08 ENCOUNTER — OFFICE VISIT (OUTPATIENT)
Dept: ORTHOPEDIC SURGERY | Facility: CLINIC | Age: 70
End: 2020-10-08

## 2020-10-08 VITALS — WEIGHT: 179 LBS | BODY MASS INDEX: 29.82 KG/M2 | HEIGHT: 65 IN

## 2020-10-08 DIAGNOSIS — M23.92 INTERNAL DERANGEMENT OF LEFT KNEE: Primary | ICD-10-CM

## 2020-10-08 DIAGNOSIS — M25.562 ACUTE PAIN OF LEFT KNEE: ICD-10-CM

## 2020-10-08 DIAGNOSIS — I10 ESSENTIAL HYPERTENSION: ICD-10-CM

## 2020-10-08 PROCEDURE — 20610 DRAIN/INJ JOINT/BURSA W/O US: CPT | Performed by: ORTHOPAEDIC SURGERY

## 2020-10-08 PROCEDURE — 99213 OFFICE O/P EST LOW 20 MIN: CPT | Performed by: ORTHOPAEDIC SURGERY

## 2020-10-08 RX ADMIN — TRIAMCINOLONE ACETONIDE 40 MG: 40 INJECTION, SUSPENSION INTRA-ARTICULAR; INTRAMUSCULAR at 08:51

## 2020-10-08 RX ADMIN — LIDOCAINE HYDROCHLORIDE 2 ML: 10 INJECTION, SOLUTION INFILTRATION; PERINEURAL at 08:51

## 2020-10-08 NOTE — PROGRESS NOTES
"Aide Maxwell is a 69 y.o. female returns for     Chief Complaint   Patient presents with   • Left Knee - Follow-up   • Results     MRI [facility] 9/29/20       HISTORY OF PRESENT ILLNESS:  She continues to have pain with pivoting and twisting.  She has pain that is worse with prolonged standing and walking.  Increased activity causes increased pain.  Mostly a low dull ache.     CONCURRENT MEDICAL HISTORY:    The following portions of the patient's history were reviewed and updated as appropriate: allergies, current medications, past family history, past medical history, past social history, past surgical history and problem list.     ROS  No fevers or chills.  No chest pain or shortness of air.  No GI or  disturbances.    PHYSICAL EXAMINATION:       Ht 165.1 cm (65\")   Wt 81.2 kg (179 lb)   BMI 29.79 kg/m²     Physical Exam  Constitutional:       General: She is not in acute distress.     Appearance: She is well-developed. She is not ill-appearing.   Pulmonary:      Effort: Pulmonary effort is normal. No respiratory distress.   Neurological:      Mental Status: She is alert and oriented to person, place, and time.   Psychiatric:         Mood and Affect: Mood normal.         Behavior: Behavior normal.         Thought Content: Thought content normal.         Judgment: Judgment normal.         GAIT:     [x]  Normal  []  Antalgic    Assistive device: [x]  None  []  Walker     []  Crutches  []  Cane     []  Wheelchair  []  Stretcher    Left Knee Exam     Muscle Strength   The patient has normal left knee strength.    Tenderness   The patient is experiencing tenderness in the medial joint line and medial retinaculum.    Range of Motion   Extension: 0   Flexion: 120     Tests   Velvet:  Medial - positive   Varus: negative Valgus: negative  Drawer:  Anterior - negative         Other   Erythema: absent  Sensation: normal  Pulse: present  Swelling: none              Xr Knee 1 Or 2 View Left    Result Date: " 9/24/2020  Narrative: Ordering Provider:  Jamil Lee MD Ordering Diagnosis/Indication:  Acute pain of left knee Procedure:  XR KNEE 1 OR 2 VW LEFT Exam Date:  9/24/20 COMPARISON:  Not applicable, no relevant images available.     Impression:  AP bilateral standing of the knees with lateral of the left knee shows acceptable position and alignment of both knees with no evidence of acute bony abnormality.  No fracture or dislocation is noted.  There is mild flattening of the medial femoral condyle.  She still has relative maintenance of the joint spacing in both knees.  Mild varus positioning of the left knee.  No acute findings.  No significant patellofemoral joint arthritic changes noted. Jamil Lee MD 9/24/20     Xr Knee Bilateral Ap Standing    Result Date: 9/24/2020  Narrative: Ordering Provider:  Jamil Lee MD Ordering Diagnosis/Indication:  Acute pain of left knee Procedure:  XR KNEE BILATERAL AP STANDING Exam Date:  9/24/20 COMPARISON:  Not applicable, no relevant images available.     Impression:  AP bilateral standing of the knees with lateral of the left knee shows acceptable position and alignment of both knees with no evidence of acute bony abnormality.  No fracture or dislocation is noted.  There is mild flattening of the medial femoral condyle.  She still has relative maintenance of the joint spacing in both knees.  Mild varus positioning of the left knee.  No acute findings.  No significant patellofemoral joint arthritic changes noted. Jamil Lee MD 9/24/20     Mri Knee Left Without Contrast    Result Date: 9/29/2020  Narrative: EXAM DESCRIPTION: MRI KNEE LEFT  WO CONTRAST CLINICAL HISTORY: 69 years  Female  M25.562 Pain in left knee M23.92 Unspecified internal derangement of left knee TECHNIQUE: Multiplanar, multisequential images of the left knee were acquired without the administration of IV contrast. COMPARISON: Radiographs dated 9/24/2020 FINDINGS:  There is degenerative signal in the body and posterior horn of the medial meniscus. Mild superior articular surface fraying of the posterior horn. There is degenerative signal in the anterior horn of the lateral meniscus without tear. The anterior and posterior cruciate ligaments, medial collateral ligament, lateral collateral ligament complex, and extensor mechanism are intact. There is thinning of the articular cartilage over the weightbearing medial femoral condyle and medial tibial plateau without underlying marrow edema. Small degenerative cysts noted at the tibial spines. No other marrow signal abnormalities. No visualized focal-thickness articular cartilage defect. No suprapatellar joint effusion. Multi loculated popliteal cyst measures up to 9.5 cm. The periarticular musculature is unremarkable.     Impression: Degenerative signal in the medial and lateral menisci. Mild superior articular surface fraying of the posterior horn of the medial meniscus. Thinning of the articular cartilage in the medial compartment without full-thickness defect. 9.5 cm multiloculated popliteal cyst without joint effusion. Electronically signed by:  Letty Varela MD  9/29/2020 9:35 PM CDT Workstation: [id number]            ASSESSMENT:    Diagnoses and all orders for this visit:    Internal derangement of left knee  -     Large Joint Arthrocentesis: L knee    Acute pain of left knee  -     Large Joint Arthrocentesis: L knee    Essential hypertension        Large Joint Arthrocentesis: L knee  Date/Time: 10/8/2020 8:51 AM  Consent given by: patient  Site marked: site marked  Timeout: Immediately prior to procedure a time out was called to verify the correct patient, procedure, equipment, support staff and site/side marked as required   Supporting Documentation  Indications: pain   Procedure Details  Location: knee - L knee  Preparation: Patient was prepped and draped in the usual sterile fashion  Needle size: 22 G  Approach:  anteromedial  Medications administered: 40 mg triamcinolone acetonide 40 MG/ML; 2 mL lidocaine 1 %  Patient tolerance: patient tolerated the procedure well with no immediate complications          PLAN    Plan steroid injection today  Slowly progress activity as tolerated   discussed possible arthroscopy depending on how symptoms change with injection.  Activity as tolerated.  Use of can as needed for weight bearing support.    Patient's Body mass index is 29.79 kg/m². BMI is above normal parameters. Recommendations include: exercise counseling and nutrition counseling.      Return in about 6 weeks (around 11/19/2020) for recheck.    Jamil Lee MD

## 2020-10-10 RX ORDER — TRIAMCINOLONE ACETONIDE 40 MG/ML
40 INJECTION, SUSPENSION INTRA-ARTICULAR; INTRAMUSCULAR
Status: COMPLETED | OUTPATIENT
Start: 2020-10-08 | End: 2020-10-08

## 2020-10-10 RX ORDER — LIDOCAINE HYDROCHLORIDE 10 MG/ML
2 INJECTION, SOLUTION INFILTRATION; PERINEURAL
Status: COMPLETED | OUTPATIENT
Start: 2020-10-08 | End: 2020-10-08

## 2020-11-24 ENCOUNTER — OFFICE VISIT (OUTPATIENT)
Dept: ORTHOPEDIC SURGERY | Facility: CLINIC | Age: 70
End: 2020-11-24

## 2020-11-24 VITALS — WEIGHT: 180 LBS | HEIGHT: 65 IN | BODY MASS INDEX: 29.99 KG/M2

## 2020-11-24 DIAGNOSIS — M25.562 ACUTE PAIN OF LEFT KNEE: Primary | ICD-10-CM

## 2020-11-24 DIAGNOSIS — M23.92 INTERNAL DERANGEMENT OF LEFT KNEE: ICD-10-CM

## 2020-11-24 PROCEDURE — 20610 DRAIN/INJ JOINT/BURSA W/O US: CPT | Performed by: ORTHOPAEDIC SURGERY

## 2020-11-24 RX ADMIN — TRIAMCINOLONE ACETONIDE 40 MG: 40 INJECTION, SUSPENSION INTRA-ARTICULAR; INTRAMUSCULAR at 11:17

## 2020-11-24 RX ADMIN — LIDOCAINE HYDROCHLORIDE 2 ML: 10 INJECTION, SOLUTION INFILTRATION; PERINEURAL at 11:17

## 2020-11-24 NOTE — PROGRESS NOTES
"Aide Maxwell is a 70 y.o. female returns for     Chief Complaint   Patient presents with   • Left Knee - Follow-up       HISTORY OF PRESENT ILLNESS:  Patient in for follow up of left knee pain.  Patient reports, pain in her left knee is a mild burning pain that is always there.      CONCURRENT MEDICAL HISTORY:    The following portions of the patient's history were reviewed and updated as appropriate: allergies, current medications, past family history, past medical history, past social history, past surgical history and problem list.     ROS  No fevers or chills.  No chest pain or shortness of air.  No GI or  disturbances.    PHYSICAL EXAMINATION:       Ht 165.1 cm (65\")   Wt 81.6 kg (180 lb)   BMI 29.95 kg/m²     Physical Exam  Constitutional:       General: She is not in acute distress.     Appearance: Normal appearance. She is well-developed. She is not ill-appearing.   Pulmonary:      Effort: Pulmonary effort is normal. No respiratory distress.   Neurological:      Mental Status: She is alert and oriented to person, place, and time.   Psychiatric:         Mood and Affect: Mood normal.         Behavior: Behavior normal.         Thought Content: Thought content normal.         Judgment: Judgment normal.         GAIT:     []  Normal  [x]  Antalgic    Assistive device: [x]  None  []  Walker     []  Crutches  []  Cane     []  Wheelchair  []  Stretcher    Ortho Exam        Study Result    EXAM DESCRIPTION: MRI KNEE LEFT  WO CONTRAST     CLINICAL HISTORY: 69 years  Female  M25.562 Pain in left knee  M23.92 Unspecified internal derangement of left knee     TECHNIQUE: Multiplanar, multisequential images of the left knee  were acquired without the administration of IV contrast.     COMPARISON: Radiographs dated 9/24/2020     FINDINGS:     There is degenerative signal in the body and posterior horn of  the medial meniscus. Mild superior articular surface fraying of  the posterior horn.     There is degenerative " signal in the anterior horn of the lateral  meniscus without tear.     The anterior and posterior cruciate ligaments, medial collateral  ligament, lateral collateral ligament complex, and extensor  mechanism are intact.     There is thinning of the articular cartilage over the  weightbearing medial femoral condyle and medial tibial plateau  without underlying marrow edema. Small degenerative cysts noted  at the tibial spines. No other marrow signal abnormalities. No  visualized focal-thickness articular cartilage defect.     No suprapatellar joint effusion. Multi loculated popliteal cyst  measures up to 9.5 cm. The periarticular musculature is  unremarkable.     IMPRESSION:     Degenerative signal in the medial and lateral menisci. Mild  superior articular surface fraying of the posterior horn of the  medial meniscus.     Thinning of the articular cartilage in the medial compartment  without full-thickness defect.     9.5 cm multiloculated popliteal cyst without joint effusion.     Electronically signed by:  Letty Varela MD  9/29/2020 9:35 PM CDT  Workstation: [id number]       Large Joint Arthrocentesis: L knee  Date/Time: 11/24/2020 11:17 AM  Consent given by: patient  Site marked: site marked  Timeout: Immediately prior to procedure a time out was called to verify the correct patient, procedure, equipment, support staff and site/side marked as required   Supporting Documentation  Indications: pain   Procedure Details  Location: knee - L knee  Preparation: Patient was prepped and draped in the usual sterile fashion  Needle size: 22 G  Approach: anteromedial  Medications administered: 40 mg triamcinolone acetonide 40 MG/ML; 2 mL lidocaine 1 %  Patient tolerance: patient tolerated the procedure well with no immediate complications          ASSESSMENT:    Diagnoses and all orders for this visit:    Acute pain of left knee  -     Large Joint Arthrocentesis: L knee    Internal derangement of left knee  -     Large Joint  Arthrocentesis: L knee          PLAN    Inject left knee today  Possible scope depending on response.    Return in about 6 weeks (around 1/5/2021) for recheck.    Jamil Lee MD

## 2020-11-27 RX ORDER — TRIAMCINOLONE ACETONIDE 40 MG/ML
40 INJECTION, SUSPENSION INTRA-ARTICULAR; INTRAMUSCULAR
Status: COMPLETED | OUTPATIENT
Start: 2020-11-24 | End: 2020-11-24

## 2020-11-27 RX ORDER — LIDOCAINE HYDROCHLORIDE 10 MG/ML
2 INJECTION, SOLUTION INFILTRATION; PERINEURAL
Status: COMPLETED | OUTPATIENT
Start: 2020-11-24 | End: 2020-11-24

## 2021-02-02 ENCOUNTER — IMMUNIZATION (OUTPATIENT)
Dept: VACCINE CLINIC | Facility: HOSPITAL | Age: 71
End: 2021-02-02

## 2021-02-02 PROCEDURE — 91300 HC SARSCOV02 VAC 30MCG/0.3ML IM: CPT | Performed by: THORACIC SURGERY (CARDIOTHORACIC VASCULAR SURGERY)

## 2021-02-02 PROCEDURE — 0001A: CPT | Performed by: THORACIC SURGERY (CARDIOTHORACIC VASCULAR SURGERY)

## 2021-02-05 ENCOUNTER — TELEPHONE (OUTPATIENT)
Dept: ORTHOPEDIC SURGERY | Facility: CLINIC | Age: 71
End: 2021-02-05

## 2021-02-05 NOTE — TELEPHONE ENCOUNTER
Rosa    Patient called and said she had talked to you about maybe surgery first of March.  She wants to know if she can do it the first week of March because she will be coming home from [place] earlier than she thought.  Please call when possible

## 2021-02-23 ENCOUNTER — OFFICE VISIT (OUTPATIENT)
Dept: ORTHOPEDIC SURGERY | Facility: CLINIC | Age: 71
End: 2021-02-23

## 2021-02-23 ENCOUNTER — IMMUNIZATION (OUTPATIENT)
Dept: VACCINE CLINIC | Facility: HOSPITAL | Age: 71
End: 2021-02-23

## 2021-02-23 VITALS — WEIGHT: 181 LBS | HEIGHT: 65 IN | BODY MASS INDEX: 30.16 KG/M2

## 2021-02-23 DIAGNOSIS — M23.322 DEGENERATIVE TEAR OF POSTERIOR HORN OF MEDIAL MENISCUS OF LEFT KNEE: Primary | ICD-10-CM

## 2021-02-23 DIAGNOSIS — M25.562 ACUTE PAIN OF LEFT KNEE: ICD-10-CM

## 2021-02-23 DIAGNOSIS — M23.92 INTERNAL DERANGEMENT OF LEFT KNEE: ICD-10-CM

## 2021-02-23 DIAGNOSIS — I10 ESSENTIAL HYPERTENSION: ICD-10-CM

## 2021-02-23 PROCEDURE — 91300 HC SARSCOV02 VAC 30MCG/0.3ML IM: CPT | Performed by: THORACIC SURGERY (CARDIOTHORACIC VASCULAR SURGERY)

## 2021-02-23 PROCEDURE — 0002A: CPT | Performed by: THORACIC SURGERY (CARDIOTHORACIC VASCULAR SURGERY)

## 2021-02-23 PROCEDURE — 99214 OFFICE O/P EST MOD 30 MIN: CPT | Performed by: ORTHOPAEDIC SURGERY

## 2021-02-23 NOTE — PROGRESS NOTES
Aide Maxwell is a 70 y.o. female returns for     Chief Complaint   Patient presents with   • Left Knee - Follow-up       HISTORY OF PRESENT ILLNESS:  F/u left  knee pain. Steroid injection done on 11/24/2020 helped some.   She has pain with pivoting and twisting.  She has pain with deep knee bends.  No new injury.  She is unhappy with her quality of life and wishes to discuss potential definitive treatment options.       CONCURRENT MEDICAL HISTORY:    Past Medical History:   Diagnosis Date   • Allergic    • Breast cancer (CMS/HCC) 2000    right   • Eczema    • Hx of radiation therapy 2000       No Known Allergies    Current Outpatient Medications on File Prior to Visit   Medication Sig   • acetaminophen (TYLENOL) 500 MG tablet Take 500 mg by mouth Every 6 (Six) Hours As Needed for Mild Pain .   • Fexofenadine HCl (ALLEGRA ALLERGY PO) Take 1 tablet by mouth As Needed.   • fluticasone (FLONASE) 50 MCG/ACT nasal spray 2 sprays into the nostril(s) as directed by provider As Needed for Rhinitis.   • hydrocortisone (WESTCORT) 0.2 % cream Apply  topically to the appropriate area as directed 2 (Two) Times a Day.   • ibuprofen (ADVIL,MOTRIN) 200 MG tablet Take 200 mg by mouth Every 6 (Six) Hours As Needed for Mild Pain .   • naproxen sodium (ALEVE) 220 MG tablet Take 220 mg by mouth 2 (Two) Times a Day As Needed.     No current facility-administered medications on file prior to visit.        Past Surgical History:   Procedure Laterality Date   • BREAST BIOPSY Right 2000   • BREAST LUMPECTOMY Right 2000   • KNEE SURGERY Left    • PARATHYROIDECTOMY     • THYROIDECTOMY, PARTIAL Right    • TUBAL ABDOMINAL LIGATION         Family History   Problem Relation Age of Onset   • Dementia Mother    • Heart disease Father    • Leukemia Brother    • Prostate cancer Brother    • Cancer Other    • Hypertension Other    • Breast cancer Neg Hx        Social History     Socioeconomic History   • Marital status:      Spouse name:  "Not on file   • Number of children: Not on file   • Years of education: Not on file   • Highest education level: Not on file   Tobacco Use   • Smoking status: Former Smoker     Packs/day: 0.25     Years: 20.00     Pack years: 5.00     Types: Cigarettes     Quit date:      Years since quittin.1   • Smokeless tobacco: Never Used   Substance and Sexual Activity   • Alcohol use: Yes     Alcohol/week: 7.0 standard drinks     Types: 7 Cans of beer per week   • Drug use: No   • Sexual activity: Defer           ROS  No fevers or chills.  No chest pain or shortness of air.  No GI or  disturbances.  Other than left knee pain, all other systems reveiwed as negative.    PHYSICAL EXAMINATION:       Ht 165.1 cm (65\")   Wt 82.1 kg (181 lb)   BMI 30.12 kg/m²     Physical Exam  Vitals signs reviewed.   Constitutional:       General: She is not in acute distress.     Appearance: Normal appearance. She is well-developed.   Cardiovascular:      Rate and Rhythm: Normal rate and regular rhythm.      Heart sounds: Normal heart sounds.   Pulmonary:      Effort: Pulmonary effort is normal.      Breath sounds: Normal breath sounds.   Abdominal:      General: Bowel sounds are normal.      Palpations: Abdomen is soft.   Neurological:      Mental Status: She is alert and oriented to person, place, and time.   Psychiatric:         Behavior: Behavior normal.         Thought Content: Thought content normal.         Judgment: Judgment normal.         GAIT:     []  Normal  [x]  Antalgic    Assistive device: [x]  None  []  Walker     []  Crutches  []  Cane     []  Wheelchair  []  Stretcher    Left Knee Exam     Comments:  Mild diffuse tenderness.  Specifically tender along the medial joint line.  Good range of motion but tenderness after 110 degrees.  Good stability with varus valgus testing.  Good distal pulses and sensation.                Study Result     EXAM DESCRIPTION: MRI KNEE LEFT  WO CONTRAST     CLINICAL HISTORY: 69 years "  Female  M25.562 Pain in left knee  M23.92 Unspecified internal derangement of left knee     TECHNIQUE: Multiplanar, multisequential images of the left knee  were acquired without the administration of IV contrast.     COMPARISON: Radiographs dated 9/24/2020     FINDINGS:     There is degenerative signal in the body and posterior horn of  the medial meniscus. Mild superior articular surface fraying of  the posterior horn.     There is degenerative signal in the anterior horn of the lateral  meniscus without tear.     The anterior and posterior cruciate ligaments, medial collateral  ligament, lateral collateral ligament complex, and extensor  mechanism are intact.     There is thinning of the articular cartilage over the  weightbearing medial femoral condyle and medial tibial plateau  without underlying marrow edema. Small degenerative cysts noted  at the tibial spines. No other marrow signal abnormalities. No  visualized focal-thickness articular cartilage defect.     No suprapatellar joint effusion. Multi loculated popliteal cyst  measures up to 9.5 cm. The periarticular musculature is  unremarkable.     IMPRESSION:     Degenerative signal in the medial and lateral menisci. Mild  superior articular surface fraying of the posterior horn of the  medial meniscus.     Thinning of the articular cartilage in the medial compartment  without full-thickness defect.     9.5 cm multiloculated popliteal cyst without joint effusion.     Electronically signed by:  Letty Varela MD  9/29/2020 9:35 PM CDT  Workstation: [id number]             ASSESSMENT:    Diagnoses and all orders for this visit:    Degenerative tear of posterior horn of medial meniscus of left knee  -     Case Request; Standing  -     ceFAZolin (ANCEF) 2 g in sodium chloride 0.9 % 100 mL IVPB  -     Case Request    Acute pain of left knee  -     Case Request; Standing  -     ceFAZolin (ANCEF) 2 g in sodium chloride 0.9 % 100 mL IVPB  -     Case Request    Internal  derangement of left knee  -     Case Request; Standing  -     ceFAZolin (ANCEF) 2 g in sodium chloride 0.9 % 100 mL IVPB  -     Case Request    Essential hypertension  -     Case Request; Standing  -     ceFAZolin (ANCEF) 2 g in sodium chloride 0.9 % 100 mL IVPB  -     Case Request    Other orders  -     Follow Anesthesia Guidelines / Standing Orders; Future  -     Provide instructions to patient regarding NPO status  -     Follow Anesthesia Guidelines / Standing Orders; Standing  -     Verify NPO Status; Standing  -     POC Glucose Once; Standing  -     Clip operative site; Standing  -     Obtain informed consent (if not collected inpatient or PAT); Standing          PLAN    She does have some arthritic changes noted on x-ray but is having mostly mechanical type symptoms and symptoms that are consistent with the findings on MRI.  We discussed arthroscopy of the left knee to address the meniscal tearing and to improve internal derangement.  We discussed the possibility of eventual total knee arthroplasty but that the major surgical intervention was not warranted at this time.    The patient voiced understanding of the risks, benefits, and alternative forms of treatment that were discussed and the patient consents to proceed with surgery.  All risks, benefits and alternatives were discussed.  Risks including but not exclusive to anesthetic complications, including death, MI, CVA, infection, bleeding DVT, fracture, residual pain and need for future surgery.    This discussion was held with the patient by Jamil Lee MD and all questions were answered.    Plan arthroscopy of left knee with debridement of medial meniscus.        Patient's Body mass index is 30.12 kg/m². BMI is above normal parameters. Recommendations include: exercise counseling and nutrition counseling.    Return for Post-operative eval.    Jamil Lee MD

## 2021-02-24 RX ORDER — BUPIVACAINE HCL/0.9 % NACL/PF 0.1 %
2 PLASTIC BAG, INJECTION (ML) EPIDURAL ONCE
Status: CANCELLED | OUTPATIENT
Start: 2021-03-03 | End: 2021-02-24

## 2021-02-25 ENCOUNTER — TELEPHONE (OUTPATIENT)
Dept: ORTHOPEDIC SURGERY | Facility: CLINIC | Age: 71
End: 2021-02-25

## 2021-02-25 ENCOUNTER — APPOINTMENT (OUTPATIENT)
Dept: PREADMISSION TESTING | Facility: HOSPITAL | Age: 71
End: 2021-02-25

## 2021-02-25 PROBLEM — M23.322 DEGENERATIVE TEAR OF POSTERIOR HORN OF MEDIAL MENISCUS OF LEFT KNEE: Status: ACTIVE | Noted: 2021-02-25

## 2021-02-26 ENCOUNTER — APPOINTMENT (OUTPATIENT)
Dept: PREADMISSION TESTING | Facility: HOSPITAL | Age: 71
End: 2021-02-26

## 2021-02-26 VITALS
BODY MASS INDEX: 30.16 KG/M2 | HEART RATE: 84 BPM | DIASTOLIC BLOOD PRESSURE: 78 MMHG | WEIGHT: 181 LBS | SYSTOLIC BLOOD PRESSURE: 124 MMHG | OXYGEN SATURATION: 97 % | RESPIRATION RATE: 18 BRPM | HEIGHT: 65 IN

## 2021-02-26 RX ORDER — SODIUM CHLORIDE, SODIUM GLUCONATE, SODIUM ACETATE, POTASSIUM CHLORIDE, AND MAGNESIUM CHLORIDE 526; 502; 368; 37; 30 MG/100ML; MG/100ML; MG/100ML; MG/100ML; MG/100ML
1000 INJECTION, SOLUTION INTRAVENOUS CONTINUOUS
Status: CANCELLED | OUTPATIENT
Start: 2021-03-03

## 2021-02-28 ENCOUNTER — LAB (OUTPATIENT)
Dept: LAB | Facility: HOSPITAL | Age: 71
End: 2021-02-28

## 2021-02-28 DIAGNOSIS — Z01.818 PREOP TESTING: Primary | ICD-10-CM

## 2021-02-28 PROCEDURE — C9803 HOPD COVID-19 SPEC COLLECT: HCPCS

## 2021-02-28 PROCEDURE — U0004 COV-19 TEST NON-CDC HGH THRU: HCPCS

## 2021-02-28 PROCEDURE — U0005 INFEC AGEN DETEC AMPLI PROBE: HCPCS

## 2021-03-01 LAB — SARS-COV-2 ORF1AB RESP QL NAA+PROBE: NOT DETECTED

## 2021-03-03 ENCOUNTER — ANESTHESIA EVENT (OUTPATIENT)
Dept: PERIOP | Facility: HOSPITAL | Age: 71
End: 2021-03-03

## 2021-03-03 ENCOUNTER — HOSPITAL ENCOUNTER (OUTPATIENT)
Facility: HOSPITAL | Age: 71
Setting detail: HOSPITAL OUTPATIENT SURGERY
Discharge: HOME | End: 2021-03-03
Attending: ORTHOPAEDIC SURGERY | Admitting: ORTHOPAEDIC SURGERY

## 2021-03-03 ENCOUNTER — ANESTHESIA (OUTPATIENT)
Dept: PERIOP | Facility: HOSPITAL | Age: 71
End: 2021-03-03

## 2021-03-03 VITALS
SYSTOLIC BLOOD PRESSURE: 166 MMHG | TEMPERATURE: 96.9 F | BODY MASS INDEX: 29.09 KG/M2 | HEART RATE: 66 BPM | HEIGHT: 66 IN | DIASTOLIC BLOOD PRESSURE: 81 MMHG | OXYGEN SATURATION: 98 % | RESPIRATION RATE: 16 BRPM | WEIGHT: 181 LBS

## 2021-03-03 DIAGNOSIS — I10 ESSENTIAL HYPERTENSION: ICD-10-CM

## 2021-03-03 DIAGNOSIS — M23.322 DEGENERATIVE TEAR OF POSTERIOR HORN OF MEDIAL MENISCUS OF LEFT KNEE: Primary | ICD-10-CM

## 2021-03-03 DIAGNOSIS — M23.92 INTERNAL DERANGEMENT OF LEFT KNEE: ICD-10-CM

## 2021-03-03 DIAGNOSIS — M25.562 ACUTE PAIN OF LEFT KNEE: ICD-10-CM

## 2021-03-03 PROCEDURE — 25010000002 CEFAZOLIN PER 500 MG: Performed by: ORTHOPAEDIC SURGERY

## 2021-03-03 PROCEDURE — 25010000002 PROPOFOL 10 MG/ML EMULSION: Performed by: NURSE ANESTHETIST, CERTIFIED REGISTERED

## 2021-03-03 PROCEDURE — 25010000002 MIDAZOLAM PER 1 MG: Performed by: NURSE ANESTHETIST, CERTIFIED REGISTERED

## 2021-03-03 PROCEDURE — 25010000002 ONDANSETRON PER 1 MG: Performed by: NURSE ANESTHETIST, CERTIFIED REGISTERED

## 2021-03-03 PROCEDURE — 25010000002 FENTANYL CITRATE (PF) 100 MCG/2ML SOLUTION: Performed by: NURSE ANESTHETIST, CERTIFIED REGISTERED

## 2021-03-03 PROCEDURE — 29880 ARTHRS KNE SRG MNISECTMY M&L: CPT | Performed by: ORTHOPAEDIC SURGERY

## 2021-03-03 PROCEDURE — 25010000002 HYDROMORPHONE 1 MG/ML SOLUTION: Performed by: NURSE ANESTHETIST, CERTIFIED REGISTERED

## 2021-03-03 PROCEDURE — 25010000002 MORPHINE PER 10 MG: Performed by: ORTHOPAEDIC SURGERY

## 2021-03-03 PROCEDURE — 25010000002 KETOROLAC TROMETHAMINE PER 15 MG: Performed by: NURSE ANESTHETIST, CERTIFIED REGISTERED

## 2021-03-03 RX ORDER — FENTANYL CITRATE 50 UG/ML
INJECTION, SOLUTION INTRAMUSCULAR; INTRAVENOUS AS NEEDED
Status: DISCONTINUED | OUTPATIENT
Start: 2021-03-03 | End: 2021-03-03 | Stop reason: SURG

## 2021-03-03 RX ORDER — SODIUM CHLORIDE, SODIUM GLUCONATE, SODIUM ACETATE, POTASSIUM CHLORIDE, AND MAGNESIUM CHLORIDE 526; 502; 368; 37; 30 MG/100ML; MG/100ML; MG/100ML; MG/100ML; MG/100ML
1000 INJECTION, SOLUTION INTRAVENOUS CONTINUOUS
Status: DISCONTINUED | OUTPATIENT
Start: 2021-03-03 | End: 2021-03-03 | Stop reason: HOSPADM

## 2021-03-03 RX ORDER — HYDROCODONE BITARTRATE AND ACETAMINOPHEN 7.5; 325 MG/1; MG/1
1 TABLET ORAL EVERY 4 HOURS PRN
Qty: 30 TABLET | Refills: 0 | Status: SHIPPED | OUTPATIENT
Start: 2021-03-03 | End: 2021-03-12

## 2021-03-03 RX ORDER — BUPIVACAINE HCL/0.9 % NACL/PF 0.1 %
2 PLASTIC BAG, INJECTION (ML) EPIDURAL ONCE
Status: COMPLETED | OUTPATIENT
Start: 2021-03-03 | End: 2021-03-03

## 2021-03-03 RX ORDER — MORPHINE SULFATE 10 MG/ML
INJECTION, SOLUTION INTRAMUSCULAR; INTRAVENOUS AS NEEDED
Status: DISCONTINUED | OUTPATIENT
Start: 2021-03-03 | End: 2021-03-03 | Stop reason: HOSPADM

## 2021-03-03 RX ORDER — HYDROCODONE BITARTRATE AND ACETAMINOPHEN 7.5; 325 MG/1; MG/1
1 TABLET ORAL ONCE AS NEEDED
Status: DISCONTINUED | OUTPATIENT
Start: 2021-03-03 | End: 2021-03-03 | Stop reason: HOSPADM

## 2021-03-03 RX ORDER — BUPIVACAINE HYDROCHLORIDE AND EPINEPHRINE 2.5; 5 MG/ML; UG/ML
INJECTION, SOLUTION EPIDURAL; INFILTRATION; INTRACAUDAL; PERINEURAL AS NEEDED
Status: DISCONTINUED | OUTPATIENT
Start: 2021-03-03 | End: 2021-03-03 | Stop reason: HOSPADM

## 2021-03-03 RX ORDER — MIDAZOLAM HYDROCHLORIDE 1 MG/ML
INJECTION INTRAMUSCULAR; INTRAVENOUS AS NEEDED
Status: DISCONTINUED | OUTPATIENT
Start: 2021-03-03 | End: 2021-03-03 | Stop reason: SURG

## 2021-03-03 RX ORDER — ONDANSETRON 2 MG/ML
INJECTION INTRAMUSCULAR; INTRAVENOUS AS NEEDED
Status: DISCONTINUED | OUTPATIENT
Start: 2021-03-03 | End: 2021-03-03 | Stop reason: SURG

## 2021-03-03 RX ORDER — KETOROLAC TROMETHAMINE 30 MG/ML
INJECTION, SOLUTION INTRAMUSCULAR; INTRAVENOUS AS NEEDED
Status: DISCONTINUED | OUTPATIENT
Start: 2021-03-03 | End: 2021-03-03 | Stop reason: SURG

## 2021-03-03 RX ORDER — PROPOFOL 10 MG/ML
VIAL (ML) INTRAVENOUS AS NEEDED
Status: DISCONTINUED | OUTPATIENT
Start: 2021-03-03 | End: 2021-03-03 | Stop reason: SURG

## 2021-03-03 RX ORDER — LIDOCAINE HYDROCHLORIDE 20 MG/ML
INJECTION, SOLUTION INFILTRATION; PERINEURAL AS NEEDED
Status: DISCONTINUED | OUTPATIENT
Start: 2021-03-03 | End: 2021-03-03 | Stop reason: SURG

## 2021-03-03 RX ADMIN — HYDROMORPHONE HYDROCHLORIDE 0.5 MG: 1 INJECTION, SOLUTION INTRAMUSCULAR; INTRAVENOUS; SUBCUTANEOUS at 10:38

## 2021-03-03 RX ADMIN — HYDROMORPHONE HYDROCHLORIDE 0.5 MG: 1 INJECTION, SOLUTION INTRAMUSCULAR; INTRAVENOUS; SUBCUTANEOUS at 10:48

## 2021-03-03 RX ADMIN — KETOROLAC TROMETHAMINE 15 MG: 30 INJECTION, SOLUTION INTRAMUSCULAR; INTRAVENOUS at 10:05

## 2021-03-03 RX ADMIN — FENTANYL CITRATE 50 MCG: 50 INJECTION INTRAMUSCULAR; INTRAVENOUS at 09:39

## 2021-03-03 RX ADMIN — PROPOFOL 100 MG: 10 INJECTION, EMULSION INTRAVENOUS at 09:16

## 2021-03-03 RX ADMIN — MIDAZOLAM HYDROCHLORIDE 2 MG: 2 INJECTION, SOLUTION INTRAMUSCULAR; INTRAVENOUS at 09:12

## 2021-03-03 RX ADMIN — Medication 2 G: at 09:24

## 2021-03-03 RX ADMIN — FENTANYL CITRATE 25 MCG: 50 INJECTION INTRAMUSCULAR; INTRAVENOUS at 09:34

## 2021-03-03 RX ADMIN — HYDROCODONE BITARTRATE AND ACETAMINOPHEN 1 TABLET: 7.5; 325 TABLET ORAL at 11:28

## 2021-03-03 RX ADMIN — PROPOFOL 40 MG: 10 INJECTION, EMULSION INTRAVENOUS at 09:39

## 2021-03-03 RX ADMIN — ONDANSETRON 4 MG: 2 INJECTION INTRAMUSCULAR; INTRAVENOUS at 09:42

## 2021-03-03 RX ADMIN — HYDROMORPHONE HYDROCHLORIDE 0.5 MG: 1 INJECTION, SOLUTION INTRAMUSCULAR; INTRAVENOUS; SUBCUTANEOUS at 10:57

## 2021-03-03 RX ADMIN — SODIUM CHLORIDE, SODIUM GLUCONATE, SODIUM ACETATE, POTASSIUM CHLORIDE, AND MAGNESIUM CHLORIDE 1000 ML: 526; 502; 368; 37; 30 INJECTION, SOLUTION INTRAVENOUS at 08:03

## 2021-03-03 RX ADMIN — FENTANYL CITRATE 25 MCG: 50 INJECTION INTRAMUSCULAR; INTRAVENOUS at 09:16

## 2021-03-03 RX ADMIN — LIDOCAINE HYDROCHLORIDE 100 MG: 20 INJECTION, SOLUTION INFILTRATION; PERINEURAL at 09:16

## 2021-03-03 NOTE — ANESTHESIA POSTPROCEDURE EVALUATION
Patient: Aide Maxwell    Procedure Summary     Date: 03/03/21 Room / Location: [facility] OR  / [facility] OR    Anesthesia Start: 0912 Anesthesia Stop: 1018    Procedure: arthroscopy of left knee with debridement of medial and lateral meniscus (Left Knee) Diagnosis:       Acute pain of left knee      Internal derangement of left knee      Degenerative tear of posterior horn of medial meniscus of left knee      Essential hypertension      (Acute pain of left knee [M25.562])      (Internal derangement of left knee [M23.92])      (Degenerative tear of posterior horn of medial meniscus of left knee [M23.322])      (Essential hypertension [I10])    Surgeon: Jamil Lee MD Provider: Angelo Jimenez MD    Anesthesia Type: general ASA Status: 3          Anesthesia Type: general    Vitals  No vitals data found for the desired time range.          Post Anesthesia Care and Evaluation    Patient location during evaluation: PACU  Patient participation: waiting for patient participation  Level of consciousness: responsive to verbal stimuli  Pain management: adequate  Airway patency: patent  Anesthetic complications: No anesthetic complications    Cardiovascular status: acceptable  Respiratory status: acceptable and face mask  Hydration status: acceptable    Comments: ---------------------------               03/03/21                     1018       ---------------------------   BP:          110/63         Pulse:         86           Resp:          18           Temp:   97.3 °F (36.3 °C)   SpO2:          94%         ---------------------------

## 2021-03-03 NOTE — OP NOTE
NAME: Aide Maxwell     YOB: 1950    DATE OF SURGERY: 3/3/2021    PREOPERATIVE DIAGNOSIS:  Acute pain of left knee [M25.562]  Internal derangement of left knee [M23.92]  Degenerative tear of posterior horn of medial meniscus of left knee [M23.322]  Essential hypertension [I10]    POSTOPERATIVE DIAGNOSIS:  Post-Op Diagnosis Codes:     * Acute pain of left knee [M25.562]     * Internal derangement of left knee [M23.92]     * Degenerative tear of posterior horn of medial meniscus of left knee [M23.322]     * Essential hypertension [I10]    PROCEDURE PERFORMED:  Procedure(s) (LRB):  arthroscopy of left knee with debridement of medial and lateral meniscus (Left)      SURGEON:  Jamil Lee MD    Assistant: Yuly Orozco CSA was responsible for performing the following activities: Suturing, Closing, Placing Dressing and Held/Positioned Camera and their skilled assistance was necessary for the success of this case.     Staff:    Circulator: Nayeli Quick RN  Scrub Person: Faina Galvan Technician: Miranda Levi CST  Assistant: Yuly Orozco CSA    Anesthesia: General    Estimated Blood Loss: minimal    Specimens : None    Complications: none    DESCRIPTION OF PROCEDURE: The patient was taken to the operating room and placed in the supine position. After adequate induction of general anesthesia the leg was prepped and draped in the usual sterile fashion exsanguinated with an Ace bandage and the tourniquet inflated to 250 mmHg.  The leg was placed in a well-padded leg greenwood. An anterior lateral portal site was then created and the scope was introduced.  The knee was examined in sequential fashion.       The scope was then introduced into the medial joint.  Medial joint findings included arthritic changes in the medial femoral condyle.  There also was noted to be significant meniscal fraying in the posterior horn in the mid body of the meniscus.    At this point the  anteromedial portal was created with spinal needle for localization.  The probe was utilized to identify the extent of the meniscal tear.  Combination of straight, curved, and up-biting baskets were used to debride the mid body and posterior horn of the meniscus.  A straight and curved shaver were also used to complete the resection of the diseased meniscus as well as to remove any nuclear cartilage that was frayed and irregular.  The shaver was utilized to remove any remaining loose bodies within the joint space.    The ACL was then examined and noted to be normal.    The lateral joint was examined and found to have intact lateral meniscus posteriorly.  There was fraying of the meniscus along the mid body in the anterior horn.  This area was debrided with the suction shaver as well.  The lateral aspect of the lateral femoral condyle was noted to be devoid of articular cartilage almost completely.  There was some frayed articular cartilage in this area as well which was debrided with the curved and straight gerri to smooth the edges.  The patellofemoral joint was examined and found to have mild arthritic changes and significant synovial proliferation.  The synovial proliferation was debrided with a suction shaver.  The shaver was then advanced into the suprapatellar pouch where it was allowed to run on suction to remove any remaining loose fragments.    The knee was then expressed of excess fluid, the portal sites were closed.  Sterile dressings were then applied the patient was awakened and transferred to the recovery room.  At the end of the case the sponge and needle counts were reported as being correct and there were no known complications.      Findings: as above      Jamil Lee MD     Date: 3/3/2021  Time: 10:32 CST

## 2021-03-03 NOTE — INTERVAL H&P NOTE
H&P reviewed. The patient was examined and there are no changes to the H&P.     Vitals:    03/03/21 0759   BP: 165/88   Pulse: 86   Resp: 18   Temp: 97.3 °F (36.3 °C)   SpO2: 99%     Electronically signed by Jamil Lee MD on 03/03/21 at 08:53 CST

## 2021-03-03 NOTE — ANESTHESIA PROCEDURE NOTES
Airway  Urgency: elective    Date/Time: 3/3/2021 9:18 AM  Airway not difficult    General Information and Staff    Patient location during procedure: OR  CRNA: Hector Correa CRNA    Indications and Patient Condition  Indications for airway management: airway protection    Preoxygenated: yes  MILS maintained throughout  Mask difficulty assessment: 0 - not attempted    Final Airway Details  Final airway type: supraglottic airway      Successful airway: I-gel  Size 4    Number of attempts at approach: 1  Assessment: lips, teeth, and gum same as pre-op

## 2021-03-03 NOTE — ANESTHESIA PREPROCEDURE EVALUATION
Anesthesia Evaluation     no history of anesthetic complications:  NPO Solid Status: > 8 hours  NPO Liquid Status: > 8 hours           Airway   Mallampati: I  TM distance: >3 FB  Neck ROM: full  No difficulty expected  Dental - normal exam     Pulmonary - normal exam    breath sounds clear to auscultation  (-) COPD, asthma, sleep apnea, not a smoker    ROS comment: Snores  Seasonal allergies  Cardiovascular - normal exam  Exercise tolerance: good (4-7 METS)    Rhythm: regular  Rate: normal    (-) hypertension, valvular problems/murmurs, dysrhythmias, angina, cardiac stents, DVT, hyperlipidemia      Neuro/Psych  (-) seizures, TIA, CVA, headaches, weakness, numbness, psychiatric history  GI/Hepatic/Renal/Endo    (+)  GERD well controlled,    (-) hepatitis, liver disease, no renal disease, diabetes, no thyroid disorder    Musculoskeletal     (+) arthralgias,   Abdominal    Substance History   (+) alcohol use (daily wine or manuel),   (-) drug use     OB/GYN          Other   arthritis,    history of cancer (breast in 2000)                    Anesthesia Plan    ASA 3     general     intravenous induction     Anesthetic plan, all risks, benefits, and alternatives have been provided, discussed and informed consent has been obtained with: patient.

## 2021-03-08 ENCOUNTER — TREATMENT (OUTPATIENT)
Dept: PHYSICAL THERAPY | Facility: CLINIC | Age: 71
End: 2021-03-08

## 2021-03-08 DIAGNOSIS — M23.322 DEGENERATIVE TEAR OF POSTERIOR HORN OF MEDIAL MENISCUS OF LEFT KNEE: ICD-10-CM

## 2021-03-08 DIAGNOSIS — M23.92 INTERNAL DERANGEMENT OF LEFT KNEE: Primary | ICD-10-CM

## 2021-03-08 PROCEDURE — 97140 MANUAL THERAPY 1/> REGIONS: CPT | Performed by: PHYSICAL THERAPIST

## 2021-03-08 PROCEDURE — 97110 THERAPEUTIC EXERCISES: CPT | Performed by: PHYSICAL THERAPIST

## 2021-03-08 NOTE — PROGRESS NOTES
Physical Therapy Initial Evaluation and Plan of Care      Patient: Aide Maxwell   : 1950  Diagnosis/ICD-10 Code:  Internal derangement of left knee [M23.92]  Referring practitioner: Jamil Lee, *  Date of Initial Visit: 3/8/2021  Today's Date: 3/8/2021  Patient seen for 1 sessions    Recheck due: 3/29/2021  MD appt: 3/12/2021  Auth: Medicare guidelines         Subjective Questionnaire: LEFS: 44/80      Subjective Evaluation    History of Present Illness  Date of surgery: 3/3/2021  Mechanism of injury: Pt s/p L knee arthroscopy with medial/lateral meniscectomy on 3/3/2021. Pt was not ambulating with an AD prior to surgery and is using cane currently. Knee has felt good overall since surgery, still with some soreness and swelling. Has been icing and following MD orders.      Patient Occupation: Pt enjoys needle work, reading. [place]  Current pain ratin  At best pain ratin  At worst pain ratin  Quality: dull ache and tight  Relieving factors: ice  Aggravating factors: ambulation, standing, movement, repetitive movement and prolonged positioning  Progression: improved    Social Support  Lives in: one-story house (with basement; 1 step to enter)  Lives with: spouse    Treatments  Current treatment: medication  Patient Goals  Patient goals for therapy: decreased pain, decreased edema, increased motion, increased strength, independence with ADLs/IADLs and return to sport/leisure activities             Objective          Observations     Additional Knee Observation Details  Edema present L knee vs R. Incisions healing well, stitches in place. No drainage. No signs/symtoms of infection. No calf tenderness/redness.    Palpation   Left   Tenderness of the distal biceps femoris, distal semimembranosus and distal semitendinosus.     Tenderness   Left Knee   Tenderness in the lateral joint line and medial joint line.     Neurological Testing      Sensation     Knee   Left Knee   Intact: light touch    Right Knee   Intact: light touch     Active Range of Motion   Left Knee   Flexion: 118 degrees   Extension: 0 degrees     Right Knee   Flexion: 129 degrees   Extension: Right knee active extension: 2 deg of hyperextension.     Patellar Mobility     Right Knee Patellar tendons within functional limits include the medial, lateral, superior and inferior.     Additional Patellar Mobility Details  Mildly taut with patella mobilizations on L in all directions    Patellar Static Positioning   Right Knee: WFL    Strength/Myotome Testing     Left Hip   Planes of Motion   Flexion: 4  Abduction: 4-    Right Hip   Planes of Motion   Flexion: 5  Abduction: 4+    Left Knee   Flexion: 4  Extension: 4  Quadriceps contraction: fair (Mild quad lag with independent active SLR flex)    Right Knee   Flexion: 5  Extension: 5  Quadriceps contraction: good    Ambulation     Comments   Ambulates with SPC, fairly good gait mechanics. Occasional cues for heel strike/TKE on L    Functional Assessment     Comments  Able to ascend/descend training steps reciprocally with bilateral rail assist. Decreased eccentric control/compensation noted with descent at both 4 and 6 inch steps.          Assessment & Plan     Assessment  Impairments: abnormal gait, abnormal or restricted ROM, activity intolerance, impaired balance, impaired physical strength, lacks appropriate home exercise program and pain with function  Assessment details: Pt is a 70 y.o. female presenting s/p L knee arthroscopy with medial/lateral meniscus debridement on 3/3/2021. Pt demonstrates an overall decrease in L knee functional ROM, knee strength, & stability which is limiting performance with functional mobility and daily activities at this time. Pt currently using SPC for gait, did not use an AD prior to surgery. Pt did well with initial post op exercises for HEP administration. Pt does demonstrate a mild quad lag with  active SLR but otherwise has fairly good quad recruitment. Pt will benefit from skilled PT services to address these deficits for improvements in functional knee ROM, knee strength/stability, gait performance, dynamic balance/proprioception, & tolerance to daily functional activities.  Prognosis: good  Functional Limitations: sleeping, walking, uncomfortable because of pain, moving in bed, standing, stooping and unable to perform repetitive tasks  Goals  Plan Goals: STG's by 3/29/20  1) Demonstrate independence/compliance in performance of initial HEP  2) Pt will be able to maintain L knee ext AROM at 0 deg  3) Demonstrate improved L knee flex AROM to 120 deg or greater  4) Perform active independent L SLR flex 2x10 with no quad lag, good eccentric control  5) Ambulate independently with SPC demonstrate good gait mechanics LLE    LTG's by 4/19/20  1) Subjectively report 60% overall improvement or greater  2) Improve LEFS score to 55/80 or greater  3) Ambulate independently with no AD, non-antalgic gait  4) Demonstrate improved L knee flex/ext MMT to 5/5  5) Demonstrate improved L hip flex/abd MMT to 5/5  6) Ascend/descend 5 steps x 5 with reciprocal pattern, good eccentric control, no compensation  7) Perform L SLS on level ground for 30 seconds with minimal finger touch for balance at rail    Plan  Therapy options: will be seen for skilled physical therapy services  Planned modality interventions: cryotherapy  Planned therapy interventions: balance/weight-bearing training, flexibility, functional ROM exercises, gait training, home exercise program, joint mobilization, manual therapy, neuromuscular re-education, soft tissue mobilization, strengthening, stretching, therapeutic activities and abdominal trunk stabilization  Duration in visits: 12  Treatment plan discussed with: patient  Plan details: *Per MD: Mild/moderate OA medial femoral condyle, mild OA lateral fem condyle. Debrided medial and lateral meniscus.  Slowly mobilize as tolerated. No restrictions. Swelling control. Assistive device as needed*    Focus on overall functional ROM activities. Knee/qaud strengthening and stability. Gait training. Balance/proprioception. Pt will be out of town next week. Update HEP over the next 2 visits so pt will have a good program while gone.        Timed:  Manual Therapy:    5     mins  54594;  Therapeutic Exercise:    24     mins  61308;     Neuromuscular Radha:        mins  25360;    Therapeutic Activity:          mins  39271;     Gait Training:           mins  02854;     Ultrasound:          mins  64248;    Electrical Stimulation:         mins  71461 ( );  Dry Needling:                     mins  self-pay;    Untimed:  Electrical Stimulation:         mins  73356 ( );  Mechanical Traction:         mins  80376;     Timed Treatment:   29   mins   Total Treatment:     43   mins    PT SIGNATURE: Yessi Smith, PT   DATE TREATMENT INITIATED: 3/8/2021    Initial Certification  Certification Period: 6/6/2021  I certify that the therapy services are furnished while this patient is under my care.  The services outlined above are required by this patient, and will be reviewed every 90 days.     PHYSICIAN: Jamil Lee MD      DATE:     Please sign and return via fax to [phone].. Thank you, [facility].

## 2021-03-10 ENCOUNTER — TREATMENT (OUTPATIENT)
Dept: PHYSICAL THERAPY | Facility: CLINIC | Age: 71
End: 2021-03-10

## 2021-03-10 DIAGNOSIS — M23.92 INTERNAL DERANGEMENT OF LEFT KNEE: Primary | ICD-10-CM

## 2021-03-10 DIAGNOSIS — M23.322 DEGENERATIVE TEAR OF POSTERIOR HORN OF MEDIAL MENISCUS OF LEFT KNEE: ICD-10-CM

## 2021-03-10 PROCEDURE — 97110 THERAPEUTIC EXERCISES: CPT | Performed by: PHYSICAL THERAPIST

## 2021-03-10 NOTE — PROGRESS NOTES
Physical Therapy Daily Treatment Note      Patient: Aide Maxwell   : 1950  Referring practitioner: Jamil Lee, *  Date of Initial Visit: Type: THERAPY  Noted: 3/8/2021  Today's Date: 3/10/2021  Patient seen for 2 sessions    Recert due:  3-29-21  MD followup:  3-12-21     Aide Maxwell reports:   Subjective Questionnaire:       Subjective  Reports knee is sore medially at joint line.  Pre RX pain 2/10.     Objective          Active Range of Motion   Left Knee   Flexion: 120 degrees   Extension: 0 degrees       See Exercise, Manual, and Modality Logs for complete treatment.       Assessment & Plan     Assessment  Assessment details: Pt tolerated therex well and progressed to CKC.  Gained 2° flexion.  Demos good SLR in mick-recumbent position.  Updated HEP to include HS stretch, CR/TR and march.     Goals  Plan Goals: Plan Goals: STG's by 3/29/20  1) Demonstrate independence/compliance in performance of initial HEP  2) Pt will be able to maintain L knee ext AROM at 0 deg  3) Demonstrate improved L knee flex AROM to 120 deg or greater  4) Perform active independent L SLR flex 2x10 with no quad lag, good eccentric control  5) Ambulate independently with SPC demonstrate good gait mechanics LLE    LTG's by 20  1) Subjectively report 60% overall improvement or greater  2) Improve LEFS score to 55/80 or greater  3) Ambulate independently with no AD, non-antalgic gait  4) Demonstrate improved L knee flex/ext MMT to 5/5  5) Demonstrate improved L hip flex/abd MMT to 5/5  6) Ascend/descend 5 steps x 5 with reciprocal pattern, good eccentric control, no compensation  7) Perform L SLS on level ground for 30 seconds with minimal finger touch for balance at rail    Plan  Duration in visits: 12  Plan details: MD note for 3-12-21 appt.  Add mini squats.  Pt will be out of town next week.         Visit Diagnoses:    ICD-10-CM ICD-9-CM   1. Internal derangement of left knee  M23.92 717.9   2. Degenerative  tear of posterior horn of medial meniscus of left knee  M23.322 717.2                  Timed:  Manual Therapy:         mins  43167;  Therapeutic Exercise:   45      mins  71184;     Neuromuscular Radha:        mins  49151;    Therapeutic Activity:          mins  94647;     Gait Training:           mins  64686;     Ultrasound:          mins  42938;    Electrical Stimulation:         mins  80326 ( );    Untimed:  Electrical Stimulation:         mins  52807 ( );  Mechanical Traction:         mins  99135;     Timed Treatment:   45   mins   Total Treatment:    45  mins  [facility]  Physical Therapist

## 2021-03-12 ENCOUNTER — TREATMENT (OUTPATIENT)
Dept: PHYSICAL THERAPY | Facility: CLINIC | Age: 71
End: 2021-03-12

## 2021-03-12 ENCOUNTER — OFFICE VISIT (OUTPATIENT)
Dept: ORTHOPEDIC SURGERY | Facility: CLINIC | Age: 71
End: 2021-03-12

## 2021-03-12 VITALS — HEIGHT: 66 IN | WEIGHT: 181 LBS | BODY MASS INDEX: 29.09 KG/M2

## 2021-03-12 DIAGNOSIS — G89.29 CHRONIC PAIN OF LEFT KNEE: Primary | ICD-10-CM

## 2021-03-12 DIAGNOSIS — M23.322 DEGENERATIVE TEAR OF POSTERIOR HORN OF MEDIAL MENISCUS OF LEFT KNEE: ICD-10-CM

## 2021-03-12 DIAGNOSIS — M23.92 INTERNAL DERANGEMENT OF LEFT KNEE: Primary | ICD-10-CM

## 2021-03-12 DIAGNOSIS — Z98.890 S/P LEFT KNEE ARTHROSCOPY: ICD-10-CM

## 2021-03-12 DIAGNOSIS — M25.562 CHRONIC PAIN OF LEFT KNEE: Primary | ICD-10-CM

## 2021-03-12 PROCEDURE — 97110 THERAPEUTIC EXERCISES: CPT | Performed by: PHYSICAL THERAPIST

## 2021-03-12 PROCEDURE — 99024 POSTOP FOLLOW-UP VISIT: CPT | Performed by: NURSE PRACTITIONER

## 2021-03-12 NOTE — PROGRESS NOTES
Physical Therapy Daily Treatment Note      Patient: Aide Maxwell   : 1950  Referring practitioner: Jamil Lee, *  Date of Initial Visit: Type: THERAPY  Noted: 3/8/2021  Today's Date: 3/12/2021  Patient seen for 3 sessions    Recheck due: 3/29/2021  MD appt: 3/12/2021  Auth: Medicare guidelines       Aide Maxwell reports: 30% improvement      Subjective Evaluation    History of Present Illness    Subjective comment: Pt states her knee is sore/stiff this morning. Doesn't know if it's because of the weather or what. Otherwise knee doing well.Pain  Current pain ratin (at rest)  At worst pain ratin (with walking)           Objective          Active Range of Motion   Left Knee   Flexion: 120 degrees   Extension: 0 degrees     Ambulation     Comments   Ambulates without SPC during treatment. Better kinematics noted towards end of treatment.      See Exercise, Manual, and Modality Logs for complete treatment.       Assessment & Plan     Assessment  Assessment details: Pt seems to progressing quite well. Not using cane during treatment. Initially has some decreased stance but did better as treatment progressed. Did educate pt to continue using SPC if going to be walking longer distances or days she's having more pain. Knee ROM progressing very well. Still needs work on knee/qaud strengthening and stability. Updated HEP as pt will be out of town next week.    Goals  Plan Goals: Plan Goals: STG's by 3/29/20  1) Demonstrate independence/compliance in performance of initial HEP  2) Pt will be able to maintain L knee ext AROM at 0 deg  3) Demonstrate improved L knee flex AROM to 120 deg or greater  4) Perform active independent L SLR flex 2x10 with no quad lag, good eccentric control  5) Ambulate independently with SPC demonstrate good gait mechanics LLE    LTG's by 20  1) Subjectively report 60% overall improvement or greater  2) Improve LEFS score to 55/80 or greater  3) Ambulate  independently with no AD, non-antalgic gait  4) Demonstrate improved L knee flex/ext MMT to 5/5  5) Demonstrate improved L hip flex/abd MMT to 5/5  6) Ascend/descend 5 steps x 5 with reciprocal pattern, good eccentric control, no compensation  7) Perform L SLS on level ground for 30 seconds with minimal finger touch for balance at rail    Plan  Duration in visits: 12  Plan details: MD note sent with pt. Could add step ups next visit. Continue quad strengthening.        Visit Diagnoses:    ICD-10-CM ICD-9-CM   1. Internal derangement of left knee  M23.92 717.9   2. Degenerative tear of posterior horn of medial meniscus of left knee  M23.322 717.2       Progress per Plan of Care and Progress strengthening /stabilization /functional activity           Timed:  Manual Therapy:    5     mins  36438;  Therapeutic Exercise:    41     mins  66064;     Neuromuscular Radha:        mins  13480;    Therapeutic Activity:          mins  53402;     Gait Training:           mins  68181;     Ultrasound:          mins  68475;    Electrical Stimulation:         mins  07877 ( );    Untimed:  Electrical Stimulation:         mins  67840 ( );  Mechanical Traction:         mins  06452;     Timed Treatment:   46   mins   Total Treatment:     46   mins  Yessi Smith, PT  Physical Therapist

## 2021-03-12 NOTE — PROGRESS NOTES
Aide Maxwell is a 70 y.o. female is s/p       Chief Complaint   Patient presents with   • Left Knee - Post-op   • Suture / Staple Removal       HISTORY OF PRESENT ILLNESS: Patient presents to office for postoperative follow-up status post left knee arthroscopy with debridement of medial and lateral menisci performed per Dr. Lee on 3/3/2021.  Patient is doing well postoperatively and progressing as expected.  She has no unusual complaints or concerns noted.  Left knee pain has been gradually improving and pain is well-controlled.  Patient states she has some burning pain intermittently at the medial joint line.  Patient complains of increased pain towards the end of the day.  Patient has increased soreness and stiffness when she awakens in the morning.  Patient continues with her current course of physical therapy and home exercises as instructed.  Patient states she has improved knee pain and stiffness following physical therapy sessions.  Sutures are removed today.  Pain scale today is 1/10.       03/03/21 (9d) Jamil Lee MD    arthroscopy of left knee with debridement of medial and lateral meniscus - Left     No Known Allergies      Current Outpatient Medications:   •  acetaminophen (TYLENOL) 500 MG tablet, Take 500 mg by mouth Every 6 (Six) Hours As Needed for Mild Pain ., Disp: , Rfl:   •  Fexofenadine HCl (ALLEGRA ALLERGY PO), Take 1 tablet by mouth As Needed., Disp: , Rfl:   •  fluticasone (FLONASE) 50 MCG/ACT nasal spray, 2 sprays into the nostril(s) as directed by provider As Needed for Rhinitis., Disp: , Rfl:   •  hydrocortisone (WESTCORT) 0.2 % cream, Apply  topically to the appropriate area as directed 2 (Two) Times a Day. (Patient taking differently: Apply  topically to the appropriate area as directed 2 (Two) Times a Day As Needed.), Disp: 60 g, Rfl: 2  •  ibuprofen (ADVIL,MOTRIN) 200 MG tablet, Take 200 mg by mouth Every 6 (Six) Hours As Needed for Mild Pain ., Disp: , Rfl:   •   naproxen sodium (ALEVE) 220 MG tablet, Take 220 mg by mouth 2 (Two) Times a Day As Needed., Disp: , Rfl:     No fevers or chills.  No nausea or vomiting. Left knee pain.        PHYSICAL EXAMINATION:       Aide Maxwell is a 70 y.o. female    Patient is awake and alert, answers questions appropriately and is in no apparent distress.    GAIT:     []  Normal  [x]  Antalgic    Assistive device: [x]  None  []  Walker     []  Crutches  []  Cane     []  Wheelchair  []  Stretcher    Left Knee Exam     Tenderness   The patient is experiencing tenderness in the medial joint line (Mild).    Range of Motion   Extension: 0   Flexion: 120     Other   Erythema: absent  Sensation: normal  Pulse: present  Swelling: mild    Comments:  Mild pain and limitations with range of motion.  Overall good range of motion progressing as expected post knee arthroscopy.  Mild, generalized swelling noted.  Overall swelling is well controlled.  No definitive effusion or suprapatellar fullness noted.  Arthroscopic surgical incisions to the anterior knee are well approximated with no erythema no drainage noted.  No signs of infection noted.  Calf is soft and nontender.  Homans' sign is negative.            EXAM DESCRIPTION: MRI KNEE LEFT  WO CONTRAST     CLINICAL HISTORY: 69 years  Female  M25.562 Pain in left knee  M23.92 Unspecified internal derangement of left knee     TECHNIQUE: Multiplanar, multisequential images of the left knee  were acquired without the administration of IV contrast.     COMPARISON: Radiographs dated 9/24/2020     FINDINGS:     There is degenerative signal in the body and posterior horn of  the medial meniscus. Mild superior articular surface fraying of  the posterior horn.     There is degenerative signal in the anterior horn of the lateral  meniscus without tear.     The anterior and posterior cruciate ligaments, medial collateral  ligament, lateral collateral ligament complex, and extensor  mechanism are  intact.     There is thinning of the articular cartilage over the  weightbearing medial femoral condyle and medial tibial plateau  without underlying marrow edema. Small degenerative cysts noted  at the tibial spines. No other marrow signal abnormalities. No  visualized focal-thickness articular cartilage defect.     No suprapatellar joint effusion. Multi loculated popliteal cyst  measures up to 9.5 cm. The periarticular musculature is  unremarkable.     IMPRESSION:     Degenerative signal in the medial and lateral menisci. Mild  superior articular surface fraying of the posterior horn of the  medial meniscus.     Thinning of the articular cartilage in the medial compartment  without full-thickness defect.     9.5 cm multiloculated popliteal cyst without joint effusion.     Electronically signed by:  Letty Varela MD  9/29/2020 9:35 PM CDT  Workstation: [id number]    Procedure:  XR KNEE BILATERAL AP STANDING  Exam Date:  9/24/20     COMPARISON:  Not applicable, no relevant images available.     IMPRESSION: AP bilateral standing of the knees with lateral of the left knee shows acceptable position and alignment of both knees with no evidence of acute bony abnormality.  No fracture or dislocation is noted.  There is mild flattening of the medial femoral condyle.  She still has relative maintenance of the joint spacing in both knees.  Mild varus positioning of the left knee.  No acute findings.  No significant patellofemoral joint arthritic changes noted.        Jamil Lee MD  9/24/20       Procedure:  XR KNEE 1 OR 2 VW LEFT  Exam Date:  9/24/20     COMPARISON:  Not applicable, no relevant images available.     IMPRESSION: AP bilateral standing of the knees with lateral of the left knee shows acceptable position and alignment of both knees with no evidence of acute bony abnormality.  No fracture or dislocation is noted.  There is mild flattening of the medial femoral condyle.  She still has relative maintenance  of the joint spacing in both knees.  Mild varus positioning of the left knee.  No acute findings.  No significant patellofemoral joint arthritic changes noted.        Jamil Lee MD  9/24/20     ASSESSMENT:    Diagnoses and all orders for this visit:    Chronic pain of left knee    S/P left knee arthroscopy    Degenerative tear of posterior horn of medial meniscus of left knee    PLAN    Patient is doing well postoperatively and progressing as expected.  Surgical incisions are healing as expected with no signs of infection noted.  Sutures are removed today and Steri-Strips placed.  Wound care instructions reviewed, including care of Steri-Strips.  Patient is instructed to continue to monitor the incisions/knee for any signs of infection including redness, increased swelling, increased tenderness, warmth and/or purulent drainage.  Patient is instructed to notify the office immediately if any such signs of infection are noted.  Recommend to continue with current course of physical therapy and home exercises as instructed.  We discussed that she can gradually progress her weightbearing and activity as pain and swelling allow.  Patient is noted to be ambulating in office today without a cane but her gait is antalgic.  I have encouraged her to utilize a cane as needed during times of increased pain and then she can transition away from it as she improves.  Continue with elevation and ice therapy to the left knee as needed to minimize pain/swelling/inflammation.  Recommend Tylenol, Aleve or Ibuprofen as needed for pain/discomfort.  Follow-up in 4 weeks for recheck.    EMR Dragon/Transciption Disclaimer: Some of this note may be an electronic transcription/translation of spoken language to printed text.  The electronic translation of spoken language may permit erroneous, or at times, nonsensical words or phrases to be inadvertently transcribed. Although I have reviewed the note for such errors, some may still  exist.     Return in about 4 weeks (around 4/9/2021) for Recheck.        This document has been electronically signed by ALEXIS Khan on March 12, 2021 10:22 CST      ALEXIS Khan

## 2021-03-23 ENCOUNTER — TREATMENT (OUTPATIENT)
Dept: PHYSICAL THERAPY | Facility: CLINIC | Age: 71
End: 2021-03-23

## 2021-03-23 DIAGNOSIS — M23.92 INTERNAL DERANGEMENT OF LEFT KNEE: Primary | ICD-10-CM

## 2021-03-23 DIAGNOSIS — M23.322 DEGENERATIVE TEAR OF POSTERIOR HORN OF MEDIAL MENISCUS OF LEFT KNEE: ICD-10-CM

## 2021-03-23 PROCEDURE — 97110 THERAPEUTIC EXERCISES: CPT | Performed by: PHYSICAL THERAPIST

## 2021-03-23 NOTE — PROGRESS NOTES
Physical Therapy Daily Treatment Note      Patient: Aide Maxwell   : 1950  Referring practitioner: Jamil Lee, *  Date of Initial Visit: Type: THERAPY  Noted: 3/8/2021  Today's Date: 3/23/2021  Patient seen for 4 sessions    Recheck due: 3/29/2021  MD appt: 3/12/2021  Auth: Medicare guidelines       Aide Maxwell reports: 30% improvement    Subjective Evaluation    History of Present Illness    Subjective comment: Pt states she did her exercises every day while she was out of town. Knee usually stiff in the mornings and about mid afternoon. Feels good right now, just a little soreness.Pain  Current pain ratin           Objective          Active Range of Motion   Left Knee   Flexion: 128 degrees   Extension: 0 degrees       See Exercise, Manual, and Modality Logs for complete treatment.       Assessment & Plan     Assessment  Assessment details: Pt reports compliance with HEP while gone out town, which is evident in ROM measurements and performance with therex activities. Good increased in knee flex AROM noted. Did well with progressions with quad strength and stability. Still with mild quad lag with active SLR flex.    Goals  Plan Goals: STG's by 3/29/20  1) Demonstrate independence/compliance in performance of initial HEP (met)  2) Pt will be able to maintain L knee ext AROM at 0 deg (met)  3) Demonstrate improved L knee flex AROM to 120 deg or greater (met)  4) Perform active independent L SLR flex 2x10 with no quad lag, good eccentric control (progressing)  5) Ambulate independently with SPC demonstrate good gait mechanics LLE (met)    LTG's by 20  1) Subjectively report 60% overall improvement or greater  2) Improve LEFS score to 55/80 or greater  3) Ambulate independently with no AD, non-antalgic gait  4) Demonstrate improved L knee flex/ext MMT to 5/5  5) Demonstrate improved L hip flex/abd MMT to 5/5  6) Ascend/descend 5 steps x 5 with reciprocal pattern, good eccentric  control, no compensation  7) Perform L SLS on level ground for 30 seconds with minimal finger touch for balance at rail    Plan  Duration in visits: 12  Plan details: Continue progressing knee strength/stability. Fwd step up/overs next.        Visit Diagnoses:    ICD-10-CM ICD-9-CM   1. Internal derangement of left knee  M23.92 717.9   2. Degenerative tear of posterior horn of medial meniscus of left knee  M23.322 717.2       Progress per Plan of Care and Progress strengthening /stabilization /functional activity           Timed:  Manual Therapy:    5     mins  94007;  Therapeutic Exercise:    42     mins  42867;     Neuromuscular Radha:        mins  99448;    Therapeutic Activity:          mins  55652;     Gait Training:           mins  63922;     Ultrasound:          mins  71622;    Electrical Stimulation:         mins  64632 ( );    Untimed:  Electrical Stimulation:         mins  79339 ( );  Mechanical Traction:         mins  63037;     Timed Treatment:   47   mins   Total Treatment:     47   mins  Yessi Smith PT  Physical Therapist

## 2021-03-25 ENCOUNTER — TREATMENT (OUTPATIENT)
Dept: PHYSICAL THERAPY | Facility: CLINIC | Age: 71
End: 2021-03-25

## 2021-03-25 DIAGNOSIS — M23.92 INTERNAL DERANGEMENT OF LEFT KNEE: Primary | ICD-10-CM

## 2021-03-25 DIAGNOSIS — M23.322 DEGENERATIVE TEAR OF POSTERIOR HORN OF MEDIAL MENISCUS OF LEFT KNEE: ICD-10-CM

## 2021-03-25 PROCEDURE — 97110 THERAPEUTIC EXERCISES: CPT | Performed by: PHYSICAL THERAPIST

## 2021-03-25 NOTE — PROGRESS NOTES
Physical Therapy Daily Treatment Note      Patient: Aide Maxwell   : 1950  Referring practitioner: Jamil Lee, *  Date of Initial Visit: Type: THERAPY  Noted: 3/8/2021  Today's Date: 3/25/2021  Patient seen for 5 sessions    Recheck due: 3/29/2021  MD appt: 3/12/2021  Auth: Medicare guidelines       Aide Maxwell reports: 40% improvement      Subjective Evaluation    History of Present Illness    Subjective comment: Pt states her knee is feeling better this morning. No pain currently and doesn't feel as tight as it usually does.Pain  No pain reported           Objective          Active Range of Motion   Left Knee   Flexion: 130 degrees   Extension: 0 degrees     Ambulation     Comments   Non-antalgic gait with no AD.      See Exercise, Manual, and Modality Logs for complete treatment.       Assessment & Plan     Assessment  Assessment details: Pt continues with good improvements in knee flex AROM. Very mild quad lag present with active SLR. Did well with proprioception progressions this date and required minimal HHA at rail with airex beam sidestepping. Pt progressing nicely. Pt working on scar massage at home and is not as TTP at medial incision this date.    Goals  Plan Goals: STG's by 3/29/20  1) Demonstrate independence/compliance in performance of initial HEP (met)  2) Pt will be able to maintain L knee ext AROM at 0 deg (met)  3) Demonstrate improved L knee flex AROM to 120 deg or greater (met)  4) Perform active independent L SLR flex 2x10 with no quad lag, good eccentric control (progressing)  5) Ambulate independently with SPC demonstrate good gait mechanics LLE (met)    LTG's by 20  1) Subjectively report 60% overall improvement or greater  2) Improve LEFS score to 55/80 or greater  3) Ambulate independently with no AD, non-antalgic gait  4) Demonstrate improved L knee flex/ext MMT to 5/5  5) Demonstrate improved L hip flex/abd MMT to 5/5  6) Ascend/descend 5 steps x 5 with  reciprocal pattern, good eccentric control, no compensation  7) Perform L SLS on level ground for 30 seconds with minimal finger touch for balance at rail    Plan  Duration in visits: 12  Plan details: Fwd & lateral step up/overs next. Possible physioball wall squats        Visit Diagnoses:    ICD-10-CM ICD-9-CM   1. Internal derangement of left knee  M23.92 717.9   2. Degenerative tear of posterior horn of medial meniscus of left knee  M23.322 717.2       Progress per Plan of Care and Progress strengthening /stabilization /functional activity           Timed:  Manual Therapy:    5     mins  40229;  Therapeutic Exercise:    40     mins  36329;     Neuromuscular Radha:        mins  13095;    Therapeutic Activity:          mins  83139;     Gait Training:           mins  88354;     Ultrasound:          mins  99902;    Electrical Stimulation:         mins  28382 ( );    Untimed:  Electrical Stimulation:         mins  88058 ( );  Mechanical Traction:         mins  64202;     Timed Treatment:   45   mins   Total Treatment:     45   mins  Yessi Smith PT  Physical Therapist

## 2021-03-29 ENCOUNTER — TREATMENT (OUTPATIENT)
Dept: PHYSICAL THERAPY | Facility: CLINIC | Age: 71
End: 2021-03-29

## 2021-03-29 DIAGNOSIS — M23.322 DEGENERATIVE TEAR OF POSTERIOR HORN OF MEDIAL MENISCUS OF LEFT KNEE: ICD-10-CM

## 2021-03-29 DIAGNOSIS — M23.92 INTERNAL DERANGEMENT OF LEFT KNEE: Primary | ICD-10-CM

## 2021-03-29 PROCEDURE — 97112 NEUROMUSCULAR REEDUCATION: CPT | Performed by: PHYSICAL THERAPIST

## 2021-03-29 PROCEDURE — 97110 THERAPEUTIC EXERCISES: CPT | Performed by: PHYSICAL THERAPIST

## 2021-03-29 NOTE — PROGRESS NOTES
"   Physical Therapy Daily Treatment Note      Patient: Aide Maxwell   : 1950  Referring practitioner: Jamil Lee, *  Date of Initial Visit: Type: THERAPY  Noted: 3/8/2021  Today's Date: 3/29/2021  Patient seen for 6 sessions    Recheck due: 3/29/2021  MD appt: 3/12/2021  Auth: Medicare guidelines     Aide Maxwell reports: 40% improvement        Subjective Evaluation    History of Present Illness    Subjective comment: pt states that she is doing well since having sx. States that she isnt having any pain today but does report some discomfort around the knee cap.Pain  Current pain ratin (\"discomfort\")           Objective          Active Range of Motion   Left Knee   Flexion: 128 degrees       See Exercise, Manual, and Modality Logs for complete treatment.       Assessment & Plan     Assessment  Assessment details: Pt able to display good ecc control with fwd step up and overs today and pt reported that it felt better doing them today than usual. Pt able to display good form with mini squats as well as with physioball wall squats. No quad lag with SLRs    Goals  Plan Goals: STG's by 3/29/20  1) Demonstrate independence/compliance in performance of initial HEP (met)  2) Pt will be able to maintain L knee ext AROM at 0 deg (met)  3) Demonstrate improved L knee flex AROM to 120 deg or greater (met)  4) Perform active independent L SLR flex 2x10 with no quad lag, good eccentric control (progressing)  5) Ambulate independently with SPC demonstrate good gait mechanics LLE (met)    LTG's by 20  1) Subjectively report 60% overall improvement or greater  2) Improve LEFS score to 55/80 or greater  3) Ambulate independently with no AD, non-antalgic gait  4) Demonstrate improved L knee flex/ext MMT to 5/5  5) Demonstrate improved L hip flex/abd MMT to 5/5  6) Ascend/descend 5 steps x 5 with reciprocal pattern, good eccentric control, no compensation  7) Perform L SLS on level ground for 30 seconds " with minimal finger touch for balance at rail    Plan  Duration in visits: 12  Plan details: Could try to progress to 6 inch step up and overs        Visit Diagnoses:    ICD-10-CM ICD-9-CM   1. Internal derangement of left knee  M23.92 717.9   2. Degenerative tear of posterior horn of medial meniscus of left knee  M23.322 717.2       Progress per Plan of Care and Progress strengthening /stabilization /functional activity           Timed:  Manual Therapy:         mins  28900;  Therapeutic Exercise:    35     mins  58183;     Neuromuscular Radha:    10    mins  92147;    Therapeutic Activity:          mins  53577;     Gait Training:           mins  43869;     Ultrasound:          mins  80020;    Electrical Stimulation:         mins  95101 ( );    Untimed:  Electrical Stimulation:         mins  29933 ( );  Mechanical Traction:         mins  46786;     Timed Treatment:   45   mins   Total Treatment:     55   mins  Stephie Kilgore PTA  Physical Therapist

## 2021-03-31 ENCOUNTER — TREATMENT (OUTPATIENT)
Dept: PHYSICAL THERAPY | Facility: CLINIC | Age: 71
End: 2021-03-31

## 2021-03-31 DIAGNOSIS — M23.322 DEGENERATIVE TEAR OF POSTERIOR HORN OF MEDIAL MENISCUS OF LEFT KNEE: ICD-10-CM

## 2021-03-31 DIAGNOSIS — M23.92 INTERNAL DERANGEMENT OF LEFT KNEE: Primary | ICD-10-CM

## 2021-03-31 PROCEDURE — 97110 THERAPEUTIC EXERCISES: CPT | Performed by: PHYSICAL THERAPIST

## 2021-03-31 NOTE — PROGRESS NOTES
Physical Therapy Daily Treatment Note      Patient: Aide Maxwell   : 1950  Referring practitioner: Jamil Lee, *  Date of Initial Visit: Type: THERAPY  Noted: 3/8/2021  Today's Date: 3/31/2021  Patient seen for 7 sessions    Recert due:  3-29-21  MD followup:  21     Aide Maxwell reports: 40% improvement  Subjective Questionnaire:       Subjective  Reports the knee is always a little stiff in the morning.  Pre RX pain 0/10.     Objective          Active Range of Motion   Left Knee   Flexion: 128 degrees   Extension: 0 degrees       See Exercise, Manual, and Modality Logs for complete treatment.       Assessment & Plan     Assessment  Assessment details: Pt maintaining good AROM.  Increased step height with step overs today.  Now able to perform sit <-> stands from chair without Airex.  No c/o pain during treatment.     Goals  Plan Goals: Plan Goals: STG's by 3/29/20  1) Demonstrate independence/compliance in performance of initial HEP (met)  2) Pt will be able to maintain L knee ext AROM at 0 deg (met)  3) Demonstrate improved L knee flex AROM to 120 deg or greater (met)  4) Perform active independent L SLR flex 2x10 with no quad lag, good eccentric control (progressing)  5) Ambulate independently with SPC demonstrate good gait mechanics LLE (met)    LTG's by 20  1) Subjectively report 60% overall improvement or greater  2) Improve LEFS score to 55/80 or greater  3) Ambulate independently with no AD, non-antalgic gait (Met)  4) Demonstrate improved L knee flex/ext MMT to 5/5  5) Demonstrate improved L hip flex/abd MMT to 5/5  6) Ascend/descend 5 steps x 5 with reciprocal pattern, good eccentric control, no compensation  7) Perform L SLS on level ground for 30 seconds with minimal finger touch for balance at rail    Plan  Duration in visits: 12  Plan details: Assess SLS for goal attainment.  Possibly add Cybex LP2        Visit Diagnoses:    ICD-10-CM ICD-9-CM   1. Internal  derangement of left knee  M23.92 717.9   2. Degenerative tear of posterior horn of medial meniscus of left knee  M23.322 717.2                  Timed:  Manual Therapy:         mins  75164;  Therapeutic Exercise:   50      mins  66590;     Neuromuscular Radha:        mins  00766;    Therapeutic Activity:          mins  89821;     Gait Training:           mins  74353;     Ultrasound:          mins  70959;    Electrical Stimulation:         mins  59872 ( );    Untimed:  Electrical Stimulation:         mins  07788 ( );  Mechanical Traction:         mins  80483;     Timed Treatment:   50   mins   Total Treatment:    65    mins  Alejandrina Waller PTA  Physical Therapist

## 2021-04-07 ENCOUNTER — TREATMENT (OUTPATIENT)
Dept: PHYSICAL THERAPY | Facility: CLINIC | Age: 71
End: 2021-04-07

## 2021-04-07 DIAGNOSIS — M23.322 DEGENERATIVE TEAR OF POSTERIOR HORN OF MEDIAL MENISCUS OF LEFT KNEE: ICD-10-CM

## 2021-04-07 DIAGNOSIS — M23.92 INTERNAL DERANGEMENT OF LEFT KNEE: Primary | ICD-10-CM

## 2021-04-07 PROCEDURE — 97110 THERAPEUTIC EXERCISES: CPT | Performed by: PHYSICAL THERAPIST

## 2021-04-07 NOTE — PROGRESS NOTES
Re-Assessment / Re-Certification      Patient: Aide Maxwell   : 1950  Diagnosis/ICD-10 Code:  Internal derangement of left knee [M23.92]  Referring practitioner: Jamil Lee, *  Date of Initial Visit: Type: THERAPY  Noted: 3/8/2021  Today's Date: 2021  Patient seen for 8 sessions    Recheck due: 2021  MD appt: 2021    Subjective:   Aide Maxwell reports: 75-80% improvement  Subjective Questionnaire: LEFS: 56/80 (44/80 at initial eval)  Clinical Progress: improved  Home Program Compliance: Yes  Treatment has included: therapeutic exercise, neuromuscular re-education, manual therapy and cryotherapy    Subjective Evaluation    History of Present Illness    Subjective comment: Pt states her knee is doing pretty good overall. Reports 75-80% improvement. States it's still sore every now & then but getting much better. Doing much better going up/down stairs.Pain  No pain reported         Objective          Observations     Additional Knee Observation Details  Incisions/scars has healed well. Very minimal scar tissue restriction noted. Not has knotty at most lateral incision.    Tenderness   Left Knee   No tenderness in the lateral joint line and medial joint line.     Neurological Testing     Sensation     Knee   Left Knee   Intact: light touch    Right Knee   Intact: light touch     Active Range of Motion   Left Knee   Flexion: 128 degrees   Extension: 0 degrees     Patellar Mobility   Left Knee Patellar tendons within functional limits include the medial, lateral, superior and inferior.     Right Knee Patellar tendons within functional limits include the medial, lateral, superior and inferior.     Patellar Static Positioning   Left Knee: WFL  Right Knee: WFL    Strength/Myotome Testing     Left Hip   Planes of Motion   Flexion: 5  Abduction: 4+    Right Hip   Planes of Motion   Flexion: 5  Abduction: 5    Left Knee   Flexion: 5  Extension: 4+  Quadriceps contraction: good    Right Knee  "  Flexion: 5  Extension: 5  Quadriceps contraction: good    Ambulation     Comments   Ambulates with no AD, non-antalgic gait.    Functional Assessment     Single Leg Stance   Left: 30 seconds  Right: 30 seconds    Comments  - Able to ascend/descend training steps reciprocally with single to no handrail assist. Improving eccentric control with descent at 6 inch steps. No pain reported.  - Airex SLS: L 20\"      Assessment & Plan     Assessment  Impairments: activity intolerance, impaired balance, impaired physical strength and pain with function  Assessment details: Pt has progressed quite well overall with PT as evidenced by improvements in functional knee ROM, knee strength/stabiltiy, and functional activity tolerance. Pt's L knee ROM has done very well and is WNL; near equal to opposite LE. Pt tolerating advancements in CKC stability and proprioception activities well without increased pain. More challenged with dynamic balance on compliant surfaces. Good performance with reciprocal stairs with improved eccentric control noted with descent on 6 inch steps. Also performed resisted gait activities and pt was more challenged with backwards walking in regards to eccentric control. Pt does still demonstrate impairments in functional knee strength & stability and will benefit from continued skilled PT services. Will continue until MD appointment on 4/16/2021 with possible d/c at that time.    Pt maintaining good AROM.  Increased step height with step overs today.  Now able to perform sit <-> stands from chair without Airex.  No c/o pain during treatment.   Prognosis: good  Functional Limitations: walking, uncomfortable because of pain, standing and unable to perform repetitive tasks  Goals  Plan Goals: STG's all met  1) Demonstrate independence/compliance in performance of initial HEP (met)  2) Pt will be able to maintain L knee ext AROM at 0 deg (met)  3) Demonstrate improved L knee flex AROM to 120 deg or greater " (met)  4) Perform active independent L SLR flex 2x10 with no quad lag, good eccentric control (met)  5) Ambulate independently with SPC demonstrate good gait mechanics LLE (met)    LTG's by 4/19/20  1) Subjectively report 60% overall improvement or greater (met)  2) Improve LEFS score to 55/80 or greater (met)  3) Ambulate independently with no AD, non-antalgic gait (Met)  4) Demonstrate improved L knee flex/ext MMT to 5/5 (partially met)  5) Demonstrate improved L hip flex/abd MMT to 5/5 (progressing)  6) Ascend/descend 5 steps x 5 with reciprocal pattern, good eccentric control, no compensation (met)  7) Perform L SLS on level ground for 30 seconds with minimal finger touch for balance at rail (met)    Plan  Therapy options: will be seen for skilled physical therapy services  Planned modality interventions: cryotherapy  Planned therapy interventions: abdominal trunk stabilization, balance/weight-bearing training, flexibility, functional ROM exercises, home exercise program, joint mobilization, manual therapy, neuromuscular re-education, soft tissue mobilization, stretching, strengthening and therapeutic activities  Duration in visits: 4  Treatment plan discussed with: patient  Plan details: Will continue until MD appt on 4/16 with probable d/c at that time. Continue functional knee strengthening/stabiltiy. Continue cybex strengthening. Progress to BOSU step ups next visit. May even try obstacle course - higher level.        Visit Diagnoses:    ICD-10-CM ICD-9-CM   1. Internal derangement of left knee  M23.92 717.9   2. Degenerative tear of posterior horn of medial meniscus of left knee  M23.322 717.2       Progress toward previous goals: Partially Met      Recommendations: Continue as planned  Timeframe: 2-3 more weeks  Prognosis to achieve goals: good    PT Signature: Yessi Smith, PT      Based upon review of the patient's progress and continued therapy plan, it is my medical opinion that Aide Maxwell should  continue physical therapy treatment at [facility]  [address] [facility] [address] [phone].    Signature: __________________________________  Jamil Lee MD    Timed:  Manual Therapy:    2     mins  26245;  Therapeutic Exercise:    45     mins  74162;     Neuromuscular Radha:        mins  92466;    Therapeutic Activity:          mins  86353;     Gait Training:           mins  82490;     Ultrasound:          mins  24450;    Electrical Stimulation:         mins  50201 ( );    Untimed:  Electrical Stimulation:         mins  32232 ( );  Mechanical Traction:         mins  71806;     Timed Treatment:   47   mins   Total Treatment:     47   mins

## 2021-04-08 ENCOUNTER — TREATMENT (OUTPATIENT)
Dept: PHYSICAL THERAPY | Facility: CLINIC | Age: 71
End: 2021-04-08

## 2021-04-08 DIAGNOSIS — M23.322 DEGENERATIVE TEAR OF POSTERIOR HORN OF MEDIAL MENISCUS OF LEFT KNEE: ICD-10-CM

## 2021-04-08 DIAGNOSIS — M23.92 INTERNAL DERANGEMENT OF LEFT KNEE: Primary | ICD-10-CM

## 2021-04-08 PROCEDURE — 97110 THERAPEUTIC EXERCISES: CPT | Performed by: PHYSICAL THERAPIST

## 2021-04-08 PROCEDURE — 97112 NEUROMUSCULAR REEDUCATION: CPT | Performed by: PHYSICAL THERAPIST

## 2021-04-08 NOTE — PROGRESS NOTES
Physical Therapy Daily Treatment Note      Patient: Aide Maxwell   : 1950  Referring practitioner: Jamil Lee, *  Date of Initial Visit: Type: THERAPY  Noted: 3/8/2021  Today's Date: 2021  Patient seen for 9 sessions    Recheck due: 2021  MD appt: 2021       Aide Maxwell reports: 75-80% improvement        Subjective Evaluation    History of Present Illness    Subjective comment: pt states that she is doing well this morning. No pain. Happy with progress thus farPain  Current pain ratin           Objective          Ambulation     Comments   Mild balance disturbances with CC resisted gait but corrected easily.      See Exercise, Manual, and Modality Logs for complete treatment.       Assessment & Plan     Assessment  Assessment details: Pt did well with treatment and had no issues with treatment overall. Pt did have some very mild balance disturbances with CC resisted gait. Overall good progress and good control.    Goals  Plan Goals: STG's all met  1) Demonstrate independence/compliance in performance of initial HEP (met)  2) Pt will be able to maintain L knee ext AROM at 0 deg (met)  3) Demonstrate improved L knee flex AROM to 120 deg or greater (met)  4) Perform active independent L SLR flex 2x10 with no quad lag, good eccentric control (met)  5) Ambulate independently with SPC demonstrate good gait mechanics LLE (met)    LTG's by 20  1) Subjectively report 60% overall improvement or greater (met)  2) Improve LEFS score to 55/80 or greater (met)  3) Ambulate independently with no AD, non-antalgic gait (Met)  4) Demonstrate improved L knee flex/ext MMT to 5/5 (partially met)  5) Demonstrate improved L hip flex/abd MMT to 5/5 (progressing)  6) Ascend/descend 5 steps x 5 with reciprocal pattern, good eccentric control, no compensation (met)  7) Perform L SLS on level ground for 30 seconds with minimal finger touch for balance at rail (met)    Plan  Duration in visits:  4  Plan details: Try reverse BOSU mini squats        Visit Diagnoses:    ICD-10-CM ICD-9-CM   1. Internal derangement of left knee  M23.92 717.9   2. Degenerative tear of posterior horn of medial meniscus of left knee  M23.322 717.2       Progress per Plan of Care and Progress strengthening /stabilization /functional activity           Timed:  Manual Therapy:         mins  28768;  Therapeutic Exercise:    35     mins  08273;     Neuromuscular Radha:    10    mins  47269;    Therapeutic Activity:          mins  07594;     Gait Training:           mins  67579;     Ultrasound:          mins  43138;    Electrical Stimulation:         mins  11312 ( );    Untimed:  Electrical Stimulation:         mins  83017 ( );  Mechanical Traction:         mins  35385;     Timed Treatment:   45   mins   Total Treatment:     45   mins  Stephie Kilgore [facility]  Physical Therapist

## 2021-04-12 ENCOUNTER — TREATMENT (OUTPATIENT)
Dept: PHYSICAL THERAPY | Facility: CLINIC | Age: 71
End: 2021-04-12

## 2021-04-12 DIAGNOSIS — M23.322 DEGENERATIVE TEAR OF POSTERIOR HORN OF MEDIAL MENISCUS OF LEFT KNEE: ICD-10-CM

## 2021-04-12 DIAGNOSIS — M23.92 INTERNAL DERANGEMENT OF LEFT KNEE: Primary | ICD-10-CM

## 2021-04-12 PROCEDURE — 97110 THERAPEUTIC EXERCISES: CPT | Performed by: PHYSICAL THERAPIST

## 2021-04-12 NOTE — PROGRESS NOTES
"   Physical Therapy Daily Treatment Note      Patient: Aide Maxwell   : 1950  Referring practitioner: Jamil Lee, *  Date of Initial Visit: Type: THERAPY  Noted: 3/8/2021  Today's Date: 2021  Patient seen for 10 sessions    Recheck due: 2021  MD appt: 2021       Aide Maxwell reports: 90% improvement      Subjective Evaluation    History of Present Illness    Subjective comment: Knee doing much better overall. Still gets a burning sensation at her medial knee frequently.Pain  Current pain ratin           Objective          Ambulation     Comments   Non -antalgic gait with no AD.    Functional Assessment     Comments  L SLS on airex: 26\" longest trial      See Exercise, Manual, and Modality Logs for complete treatment.       Assessment & Plan     Assessment  Assessment details: Improved overall control with CC resisted gait so added lateral direction. Pt more challenged with this but did well. Doing well with proprioception and strength progressions. Reports 90% overall improvement. Will see pt through next week and d/c at that time.    Goals  Plan Goals: STG's all met  1) Demonstrate independence/compliance in performance of initial HEP (met)  2) Pt will be able to maintain L knee ext AROM at 0 deg (met)  3) Demonstrate improved L knee flex AROM to 120 deg or greater (met)  4) Perform active independent L SLR flex 2x10 with no quad lag, good eccentric control (met)  5) Ambulate independently with SPC demonstrate good gait mechanics LLE (met)    LTG's by 20  1) Subjectively report 60% overall improvement or greater (met)  2) Improve LEFS score to 55/80 or greater (met)  3) Ambulate independently with no AD, non-antalgic gait (Met)  4) Demonstrate improved L knee flex/ext MMT to 5/5 (partially met)  5) Demonstrate improved L hip flex/abd MMT to 5/5 (progressing)  6) Ascend/descend 5 steps x 5 with reciprocal pattern, good eccentric control, no compensation (met)  7) " Perform L SLS on level ground for 30 seconds with minimal finger touch for balance at rail (met)    Plan  Duration in visits: 4  Plan details: MD note next visit. Will continue through next week and d/c at that time. Continue progressing functional knee strengthening & stability. Maybe try fwd step up/over on BOSU or fwd step up with opp hip flexion. Continue working on building comprehensive [facility] for independent management.        Visit Diagnoses:    ICD-10-CM ICD-9-CM   1. Internal derangement of left knee  M23.92 717.9   2. Degenerative tear of posterior horn of medial meniscus of left knee  M23.322 717.2       Progress per Plan of Care and Progress strengthening /stabilization /functional activity           Timed:  Manual Therapy:         mins  15223;  Therapeutic Exercise:    45     mins  67511;     Neuromuscular Radha:        mins  95658;    Therapeutic Activity:          mins  56314;     Gait Training:           mins  31316;     Ultrasound:          mins  36462;    Electrical Stimulation:         mins  71774 ( );    Untimed:  Electrical Stimulation:         mins  45518 ( );  Mechanical Traction:         mins  09068;     Timed Treatment:   45   mins   Total Treatment:     45   mins  Yessi Smith PT  Physical Therapist

## 2021-04-15 ENCOUNTER — TREATMENT (OUTPATIENT)
Dept: PHYSICAL THERAPY | Facility: CLINIC | Age: 71
End: 2021-04-15

## 2021-04-15 DIAGNOSIS — M23.322 DEGENERATIVE TEAR OF POSTERIOR HORN OF MEDIAL MENISCUS OF LEFT KNEE: ICD-10-CM

## 2021-04-15 DIAGNOSIS — M23.92 INTERNAL DERANGEMENT OF LEFT KNEE: Primary | ICD-10-CM

## 2021-04-15 PROCEDURE — 97110 THERAPEUTIC EXERCISES: CPT | Performed by: PHYSICAL THERAPIST

## 2021-04-15 NOTE — PROGRESS NOTES
Physical Therapy Daily Treatment Note      Patient: Aide Maxwell   : 1950  Referring practitioner: Jamil Lee, *  Date of Initial Visit: Type: THERAPY  Noted: 3/8/2021  Today's Date: 4/15/2021  Patient seen for 11 sessions    Recert due:  21  MD followup:  21     Aide Maxwell reports: 90% improvement  Subjective Questionnaire:       Subjective  Knee fells stiff in the morning, but improves quickly with movement.  Pre RX pain 0/10.     Objective          Active Range of Motion   Left Knee   Flexion: 127 degrees   Extension: 0 degrees       See Exercise, Manual, and Modality Logs for complete treatment.       Assessment & Plan     Assessment  Assessment details: Pt has met all goals except strength.  Doing well with balance activities.      Goals  Plan Goals: Plan Goals: STG's all met  1) Demonstrate independence/compliance in performance of initial HEP (met)  2) Pt will be able to maintain L knee ext AROM at 0 deg (met)  3) Demonstrate improved L knee flex AROM to 120 deg or greater (met)  4) Perform active independent L SLR flex 2x10 with no quad lag, good eccentric control (met)  5) Ambulate independently with SPC demonstrate good gait mechanics LLE (met)    LTG's by 20  1) Subjectively report 60% overall improvement or greater (met)  2) Improve LEFS score to 55/80 or greater (met)  3) Ambulate independently with no AD, non-antalgic gait (Met)  4) Demonstrate improved L knee flex/ext MMT to 5/5 (partially met)  5) Demonstrate improved L hip flex/abd MMT to 5/5 (progressing)  6) Ascend/descend 5 steps x 5 with reciprocal pattern, good eccentric control, no compensation (met)  7) Perform L SLS on level ground for 30 seconds with minimal finger touch for balance at rail (met)    Plan  Plan details: MD appt tomorrow.  Note sent with pt.  Planning to D/C end of next week.  Cont working on balance, strength and proprio.           Visit Diagnoses:    ICD-10-CM ICD-9-CM   1.  Internal derangement of left knee  M23.92 717.9   2. Degenerative tear of posterior horn of medial meniscus of left knee  M23.322 717.2                  Timed:  Manual Therapy:         mins  40097;  Therapeutic Exercise:   46      mins  22793;     Neuromuscular Radha:        mins  57261;    Therapeutic Activity:          mins  66623;     Gait Training:           mins  23287;     Ultrasound:          mins  76384;    Electrical Stimulation:         mins  26633 ( );    Untimed:  Electrical Stimulation:         mins  63619 ( );  Mechanical Traction:         mins  76724;     Timed Treatment:  46    mins   Total Treatment:    46    mins  Alejandrina Waller [facility]  Physical Therapist Assistant

## 2021-04-16 ENCOUNTER — OFFICE VISIT (OUTPATIENT)
Dept: ORTHOPEDIC SURGERY | Facility: CLINIC | Age: 71
End: 2021-04-16

## 2021-04-16 VITALS — HEIGHT: 66 IN | BODY MASS INDEX: 28.77 KG/M2 | WEIGHT: 179 LBS

## 2021-04-16 DIAGNOSIS — G89.29 CHRONIC PAIN OF LEFT KNEE: Primary | ICD-10-CM

## 2021-04-16 DIAGNOSIS — M25.562 CHRONIC PAIN OF LEFT KNEE: Primary | ICD-10-CM

## 2021-04-16 DIAGNOSIS — Z98.890 S/P LEFT KNEE ARTHROSCOPY: ICD-10-CM

## 2021-04-16 PROCEDURE — 99024 POSTOP FOLLOW-UP VISIT: CPT | Performed by: ORTHOPAEDIC SURGERY

## 2021-04-16 NOTE — PROGRESS NOTES
Aide Maxwell is a 70 y.o. female is s/p       Chief Complaint   Patient presents with   • Left Knee - Post-op       HISTORY OF PRESENT ILLNESS:   03/03/21 (6w 2d) Jamil Lee MD   arthroscopy of left knee with debridement of medial and lateral meniscus - Left     Patient being seen for left knee post-op. Reports pain is 8/10. Patient doing PT at  in [place] 2 days/week.   Continues to make progress.  Slowly improving.  Meniscal symptoms are much improved but she still has some generalized soreness and achiness.         No Known Allergies      Current Outpatient Medications:   •  acetaminophen (TYLENOL) 500 MG tablet, Take 500 mg by mouth Every 6 (Six) Hours As Needed for Mild Pain ., Disp: , Rfl:   •  Fexofenadine HCl (ALLEGRA ALLERGY PO), Take 1 tablet by mouth As Needed., Disp: , Rfl:   •  fluticasone (FLONASE) 50 MCG/ACT nasal spray, 2 sprays into the nostril(s) as directed by provider As Needed for Rhinitis., Disp: , Rfl:   •  hydrocortisone (WESTCORT) 0.2 % cream, Apply  topically to the appropriate area as directed 2 (Two) Times a Day. (Patient taking differently: Apply  topically to the appropriate area as directed 2 (Two) Times a Day As Needed.), Disp: 60 g, Rfl: 2  •  ibuprofen (ADVIL,MOTRIN) 200 MG tablet, Take 200 mg by mouth Every 6 (Six) Hours As Needed for Mild Pain ., Disp: , Rfl:   •  naproxen sodium (ALEVE) 220 MG tablet, Take 220 mg by mouth 2 (Two) Times a Day As Needed., Disp: , Rfl:     No fevers or chills.  No nausea or vomiting.      PHYSICAL EXAMINATION:       Aide Maxwell is a 70 y.o. female    Patient is awake and alert, answers questions appropriately and is in no apparent distress.    GAIT:     [x]  Normal  []  Antalgic    Assistive device: [x]  None  []  Walker     []  Crutches  []  Cane     []  Wheelchair  []  Stretcher    Left Knee Exam     Comments:  Range of motion 0 to 120 degrees  Mild swelling.  Good stability on exam.                  EXAM DESCRIPTION:  MRI KNEE LEFT  WO CONTRAST     CLINICAL HISTORY: 69 years  Female  M25.562 Pain in left knee  M23.92 Unspecified internal derangement of left knee     TECHNIQUE: Multiplanar, multisequential images of the left knee  were acquired without the administration of IV contrast.     COMPARISON: Radiographs dated 9/24/2020     FINDINGS:     There is degenerative signal in the body and posterior horn of  the medial meniscus. Mild superior articular surface fraying of  the posterior horn.     There is degenerative signal in the anterior horn of the lateral  meniscus without tear.     The anterior and posterior cruciate ligaments, medial collateral  ligament, lateral collateral ligament complex, and extensor  mechanism are intact.     There is thinning of the articular cartilage over the  weightbearing medial femoral condyle and medial tibial plateau  without underlying marrow edema. Small degenerative cysts noted  at the tibial spines. No other marrow signal abnormalities. No  visualized focal-thickness articular cartilage defect.     No suprapatellar joint effusion. Multi loculated popliteal cyst  measures up to 9.5 cm. The periarticular musculature is  unremarkable.     IMPRESSION:     Degenerative signal in the medial and lateral menisci. Mild  superior articular surface fraying of the posterior horn of the  medial meniscus.     Thinning of the articular cartilage in the medial compartment  without full-thickness defect.     9.5 cm multiloculated popliteal cyst without joint effusion.     Electronically signed by:  Letty Varela MD  9/29/2020 9:35 PM CDT  Workstation: [id number]      ASSESSMENT:    Diagnoses and all orders for this visit:    Chronic pain of left knee    S/P left knee arthroscopy          PLAN    Continue to slowly progress strength and conditioning as tolerated.  Slowly increase activity as pain allows.  No restrictions.  Follow-up as needed.    Patient's Body mass index is 28.89 kg/m². BMI is above normal  parameters. Recommendations include: exercise counseling and nutrition counseling.      Return if symptoms worsen or fail to improve, for recheck.    Jamil Lee MD

## 2021-04-19 ENCOUNTER — TREATMENT (OUTPATIENT)
Dept: PHYSICAL THERAPY | Facility: CLINIC | Age: 71
End: 2021-04-19

## 2021-04-19 DIAGNOSIS — M23.322 DEGENERATIVE TEAR OF POSTERIOR HORN OF MEDIAL MENISCUS OF LEFT KNEE: ICD-10-CM

## 2021-04-19 DIAGNOSIS — M23.92 INTERNAL DERANGEMENT OF LEFT KNEE: Primary | ICD-10-CM

## 2021-04-19 PROCEDURE — 97110 THERAPEUTIC EXERCISES: CPT | Performed by: PHYSICAL THERAPIST

## 2021-04-19 NOTE — PROGRESS NOTES
Physical Therapy Daily Treatment Note      Patient: Aide Maxwell   : 1950  Referring practitioner: Jamil Lee, *  Date of Initial Visit: Type: THERAPY  Noted: 3/8/2021  Today's Date: 2021  Patient seen for 12 sessions    Recert due:  21  MD followup:  21     Aide Maxwell reports: 90% improvement  Subjective Questionnaire:       Subjective  Saw MD on 21.  She was D/Cd from his care.   Pre RX pain 0/10.     Objective          Active Range of Motion   Left Knee   Flexion: 126 degrees   Extension: 0 degrees       See Exercise, Manual, and Modality Logs for complete treatment.       Assessment & Plan     Assessment  Assessment details: Pt has good ROM and is progressing strength nicely.  Working more on proprio and balance now.       Goals  Plan Goals: Plan Goals: Plan Goals: STG's all met  1) Demonstrate independence/compliance in performance of initial HEP (met)  2) Pt will be able to maintain L knee ext AROM at 0 deg (met)  3) Demonstrate improved L knee flex AROM to 120 deg or greater (met)  4) Perform active independent L SLR flex 2x10 with no quad lag, good eccentric control (met)  5) Ambulate independently with SPC demonstrate good gait mechanics LLE (met)    LTG's by 20  1) Subjectively report 60% overall improvement or greater (met)  2) Improve LEFS score to 55/80 or greater (met)  3) Ambulate independently with no AD, non-antalgic gait (Met)  4) Demonstrate improved L knee flex/ext MMT to 5/5 (partially met)  5) Demonstrate improved L hip flex/abd MMT to 5/5 (progressing)  6) Ascend/descend 5 steps x 5 with reciprocal pattern, good eccentric control, no compensation (met)  7) Perform L SLS on level ground for 30 seconds with minimal finger touch for balance at rail (met)    Plan  Duration in visits: 1  Plan details: Cont PT 1 more visit to finalize HEP.          Visit Diagnoses:    ICD-10-CM ICD-9-CM   1. Internal derangement of left knee  M23.92 717.9   2.  Degenerative tear of posterior horn of medial meniscus of left knee  M23.322 717.2                  Timed:  Manual Therapy:         mins  91706;  Therapeutic Exercise:  45       mins  34006;     Neuromuscular Radha:        mins  55651;    Therapeutic Activity:          mins  02430;     Gait Training:           mins  40978;     Ultrasound:          mins  58570;    Electrical Stimulation:         mins  07268 ( );    Untimed:  Electrical Stimulation:         mins  27640 ( );  Mechanical Traction:         mins  20204;     Timed Treatment: 45    mins  Total treatment      55    mins  Alejandrina Waller [facility]  Physical Therapist

## 2021-04-21 ENCOUNTER — TREATMENT (OUTPATIENT)
Dept: PHYSICAL THERAPY | Facility: CLINIC | Age: 71
End: 2021-04-21

## 2021-04-21 DIAGNOSIS — M23.322 DEGENERATIVE TEAR OF POSTERIOR HORN OF MEDIAL MENISCUS OF LEFT KNEE: ICD-10-CM

## 2021-04-21 DIAGNOSIS — M23.92 INTERNAL DERANGEMENT OF LEFT KNEE: Primary | ICD-10-CM

## 2021-04-21 PROCEDURE — 97530 THERAPEUTIC ACTIVITIES: CPT | Performed by: PHYSICAL THERAPIST

## 2021-04-21 PROCEDURE — 97110 THERAPEUTIC EXERCISES: CPT | Performed by: PHYSICAL THERAPIST

## 2021-04-21 NOTE — PROGRESS NOTES
Physical Therapy Daily Treatment Note/Discharge summary      Patient: Aide Maxwell   : 1950  Referring practitioner: Jamil Lee, *  Date of Initial Visit: Type: THERAPY  Noted: 3/8/2021  Today's Date: 2021  Patient seen for 13 sessions    Recert due:  21  MD followup:  21     Aide Maxwell reports: 90% improvement        Subjective Evaluation    History of Present Illness    Subjective comment: pt states that she is good to be done today. States she is happy with the progress she has made.Pain  Current pain ratin           Objective          Ambulation     Comments   NAG.      See Exercise, Manual, and Modality Logs for complete treatment.       Assessment & Plan     Assessment  Assessment details: Pt able to meet or partially meet all STGs and LTGs set at initial eval. Pt self aware of all HEP and verbalizes understanding of continuing independently    Goals  Plan Goals: STG's all met  1) Demonstrate independence/compliance in performance of initial HEP (met)  2) Pt will be able to maintain L knee ext AROM at 0 deg (met)  3) Demonstrate improved L knee flex AROM to 120 deg or greater (met)  4) Perform active independent L SLR flex 2x10 with no quad lag, good eccentric control (met)  5) Ambulate independently with SPC demonstrate good gait mechanics LLE (met)    LTG's by 20  1) Subjectively report 60% overall improvement or greater (met)  2) Improve LEFS score to 55/80 or greater (met)  3) Ambulate independently with no AD, non-antalgic gait (Met)  4) Demonstrate improved L knee flex/ext MMT to 5/5 (partially met)  5) Demonstrate improved L hip flex/abd MMT to 5/5 (partially met)  6) Ascend/descend 5 steps x 5 with reciprocal pattern, good eccentric control, no compensation (met)  7) Perform L SLS on level ground for 30 seconds with minimal finger touch for balance at rail (met)    Plan  Plan details: D/C to I management with HEP established.        Visit Diagnoses:     ICD-10-CM ICD-9-CM   1. Internal derangement of left knee  M23.92 717.9   2. Degenerative tear of posterior horn of medial meniscus of left knee  M23.322 717.2       Other D/C to I management           Timed:  Manual Therapy:         mins  69784;  Therapeutic Exercise:    35     mins  96538;     Neuromuscular Radha:        mins  03335;    Therapeutic Activity:     10     mins  27302;     Gait Training:           mins  22924;     Ultrasound:          mins  53453;    Electrical Stimulation:         mins  42469 ( );    Untimed:  Electrical Stimulation:         mins  19220 ( );  Mechanical Traction:         mins  52308;     Timed Treatment:   45   mins   Total Treatment:     45   mins  Stephie Kilgore, OLGA  Physical Therapist

## 2021-07-27 DIAGNOSIS — M79.644 CHRONIC PAIN OF RIGHT THUMB: Primary | ICD-10-CM

## 2021-07-27 DIAGNOSIS — G89.29 CHRONIC PAIN OF RIGHT THUMB: Primary | ICD-10-CM

## 2021-07-29 ENCOUNTER — OFFICE VISIT (OUTPATIENT)
Dept: ORTHOPEDIC SURGERY | Facility: CLINIC | Age: 71
End: 2021-07-29

## 2021-07-29 VITALS — HEIGHT: 65 IN | WEIGHT: 178 LBS | BODY MASS INDEX: 29.66 KG/M2

## 2021-07-29 DIAGNOSIS — I10 ESSENTIAL HYPERTENSION: ICD-10-CM

## 2021-07-29 DIAGNOSIS — M65.311 TRIGGER THUMB, RIGHT THUMB: ICD-10-CM

## 2021-07-29 DIAGNOSIS — M79.644 PAIN OF RIGHT THUMB: Primary | ICD-10-CM

## 2021-07-29 PROCEDURE — 99213 OFFICE O/P EST LOW 20 MIN: CPT | Performed by: ORTHOPAEDIC SURGERY

## 2021-08-16 ENCOUNTER — TRANSCRIBE ORDERS (OUTPATIENT)
Dept: ADMINISTRATIVE | Facility: HOSPITAL | Age: 71
End: 2021-08-16

## 2021-08-16 DIAGNOSIS — Z12.31 OTHER SCREENING MAMMOGRAM: Primary | ICD-10-CM

## 2021-08-30 ENCOUNTER — HOSPITAL ENCOUNTER (OUTPATIENT)
Dept: MAMMOGRAPHY | Facility: HOSPITAL | Age: 71
Discharge: HOME | End: 2021-08-30
Admitting: NURSE PRACTITIONER

## 2021-08-30 DIAGNOSIS — Z12.31 OTHER SCREENING MAMMOGRAM: ICD-10-CM

## 2021-08-30 PROCEDURE — 77067 SCR MAMMO BI INCL CAD: CPT

## 2021-08-30 PROCEDURE — 77063 BREAST TOMOSYNTHESIS BI: CPT

## 2021-08-31 ENCOUNTER — CLINICAL SUPPORT (OUTPATIENT)
Dept: FAMILY MEDICINE CLINIC | Facility: CLINIC | Age: 71
End: 2021-08-31

## 2021-08-31 DIAGNOSIS — R53.83 FATIGUE, UNSPECIFIED TYPE: Primary | ICD-10-CM

## 2021-08-31 DIAGNOSIS — R03.0 SITUATIONAL HYPERTENSION: ICD-10-CM

## 2021-08-31 DIAGNOSIS — E78.5 HYPERLIPIDEMIA WITH TARGET LDL LESS THAN 100: ICD-10-CM

## 2021-08-31 DIAGNOSIS — Z00.00 MEDICARE ANNUAL WELLNESS VISIT, SUBSEQUENT: ICD-10-CM

## 2021-09-01 LAB
ALBUMIN SERPL-MCNC: 4.4 G/DL (ref 3.7–4.7)
ALBUMIN/GLOB SERPL: 1.9 {RATIO} (ref 1.2–2.2)
ALP SERPL-CCNC: 85 IU/L (ref 48–121)
ALT SERPL-CCNC: 14 IU/L (ref 0–32)
AST SERPL-CCNC: 20 IU/L (ref 0–40)
BASOPHILS # BLD AUTO: 0 X10E3/UL (ref 0–0.2)
BASOPHILS NFR BLD AUTO: 1 %
BILIRUB SERPL-MCNC: 0.6 MG/DL (ref 0–1.2)
BUN SERPL-MCNC: 15 MG/DL (ref 8–27)
BUN/CREAT SERPL: 18 (ref 12–28)
CALCIUM SERPL-MCNC: 9.5 MG/DL (ref 8.7–10.3)
CHLORIDE SERPL-SCNC: 106 MMOL/L (ref 96–106)
CHOLEST SERPL-MCNC: 228 MG/DL (ref 100–199)
CO2 SERPL-SCNC: 24 MMOL/L (ref 20–29)
CREAT SERPL-MCNC: 0.85 MG/DL (ref 0.57–1)
EOSINOPHIL # BLD AUTO: 0.2 X10E3/UL (ref 0–0.4)
EOSINOPHIL NFR BLD AUTO: 4 %
ERYTHROCYTE [DISTWIDTH] IN BLOOD BY AUTOMATED COUNT: 12.6 % (ref 11.7–15.4)
GLOBULIN SER CALC-MCNC: 2.3 G/DL (ref 1.5–4.5)
GLUCOSE SERPL-MCNC: 94 MG/DL (ref 65–99)
HCT VFR BLD AUTO: 40.4 % (ref 34–46.6)
HDLC SERPL-MCNC: 58 MG/DL
HGB BLD-MCNC: 13.9 G/DL (ref 11.1–15.9)
IMM GRANULOCYTES # BLD AUTO: 0 X10E3/UL (ref 0–0.1)
IMM GRANULOCYTES NFR BLD AUTO: 1 %
LDLC SERPL CALC-MCNC: 150 MG/DL (ref 0–99)
LDLC/HDLC SERPL: 2.6 RATIO (ref 0–3.2)
LYMPHOCYTES # BLD AUTO: 1.1 X10E3/UL (ref 0.7–3.1)
LYMPHOCYTES NFR BLD AUTO: 29 %
MCH RBC QN AUTO: 31.3 PG (ref 26.6–33)
MCHC RBC AUTO-ENTMCNC: 34.4 G/DL (ref 31.5–35.7)
MCV RBC AUTO: 91 FL (ref 79–97)
MONOCYTES # BLD AUTO: 0.4 X10E3/UL (ref 0.1–0.9)
MONOCYTES NFR BLD AUTO: 10 %
NEUTROPHILS # BLD AUTO: 2.2 X10E3/UL (ref 1.4–7)
NEUTROPHILS NFR BLD AUTO: 55 %
PLATELET # BLD AUTO: 196 X10E3/UL (ref 150–450)
POTASSIUM SERPL-SCNC: 4.5 MMOL/L (ref 3.5–5.2)
PROT SERPL-MCNC: 6.7 G/DL (ref 6–8.5)
RBC # BLD AUTO: 4.44 X10E6/UL (ref 3.77–5.28)
SODIUM SERPL-SCNC: 142 MMOL/L (ref 134–144)
T4 SERPL-MCNC: 7 UG/DL (ref 4.5–12)
TRIGL SERPL-MCNC: 114 MG/DL (ref 0–149)
TSH SERPL DL<=0.005 MIU/L-ACNC: 2.88 UIU/ML (ref 0.45–4.5)
VLDLC SERPL CALC-MCNC: 20 MG/DL (ref 5–40)
WBC # BLD AUTO: 3.9 X10E3/UL (ref 3.4–10.8)

## 2021-09-02 ENCOUNTER — OFFICE VISIT (OUTPATIENT)
Dept: FAMILY MEDICINE CLINIC | Facility: CLINIC | Age: 71
End: 2021-09-02

## 2021-09-02 VITALS
DIASTOLIC BLOOD PRESSURE: 88 MMHG | OXYGEN SATURATION: 95 % | HEIGHT: 65 IN | WEIGHT: 178.6 LBS | HEART RATE: 80 BPM | BODY MASS INDEX: 29.76 KG/M2 | SYSTOLIC BLOOD PRESSURE: 158 MMHG | TEMPERATURE: 97.1 F

## 2021-09-02 DIAGNOSIS — I10 ESSENTIAL HYPERTENSION: ICD-10-CM

## 2021-09-02 DIAGNOSIS — F41.8 SITUATIONAL ANXIETY: ICD-10-CM

## 2021-09-02 DIAGNOSIS — E78.00 ELEVATED LDL CHOLESTEROL LEVEL: ICD-10-CM

## 2021-09-02 DIAGNOSIS — Z00.00 MEDICARE ANNUAL WELLNESS VISIT, SUBSEQUENT: Primary | ICD-10-CM

## 2021-09-02 PROCEDURE — G0439 PPPS, SUBSEQ VISIT: HCPCS | Performed by: NURSE PRACTITIONER

## 2021-09-02 RX ORDER — LOSARTAN POTASSIUM 25 MG/1
25 TABLET ORAL DAILY
Qty: 30 TABLET | Refills: 0 | Status: SHIPPED | OUTPATIENT
Start: 2021-09-02 | End: 2021-09-13 | Stop reason: SDUPTHER

## 2021-09-02 RX ORDER — BUSPIRONE HYDROCHLORIDE 10 MG/1
10 TABLET ORAL 3 TIMES DAILY PRN
Qty: 30 TABLET | Refills: 2 | Status: SHIPPED | OUTPATIENT
Start: 2021-09-02

## 2021-09-02 NOTE — PROGRESS NOTES
The ABCs of the Annual Wellness Visit  Subsequent Medicare Wellness Visit    Chief Complaint   Patient presents with   • Medicare Wellness-subsequent      Subjective    History of Present Illness:  Aide Maxwell is a 70 y.o. female who presents for a Subsequent Medicare Wellness Visit.    The following portions of the patient's history were reviewed and   updated as appropriate: allergies, current medications, past family history, past medical history, past social history, past surgical history and problem list.     Compared to one year ago, the patient feels her physical   health is the same.    Compared to one year ago, the patient feels her mental   health is worse.    Recent Hospitalizations:  She was not admitted to the hospital during the last year.       Current Medical Providers:  Patient Care Team:  Josseline Garcia APRN as PCP - General (Family Medicine)    Outpatient Medications Prior to Visit   Medication Sig Dispense Refill   • acetaminophen (TYLENOL) 500 MG tablet Take 500 mg by mouth Every 6 (Six) Hours As Needed for Mild Pain .     • Fexofenadine HCl (ALLEGRA ALLERGY PO) Take 1 tablet by mouth As Needed.     • fluticasone (FLONASE) 50 MCG/ACT nasal spray 2 sprays into the nostril(s) as directed by provider As Needed for Rhinitis.     • hydrocortisone (WESTCORT) 0.2 % cream Apply  topically to the appropriate area as directed 2 (Two) Times a Day. (Patient taking differently: Apply  topically to the appropriate area as directed 2 (Two) Times a Day As Needed.) 60 g 2   • ibuprofen (ADVIL,MOTRIN) 200 MG tablet Take 200 mg by mouth Every 6 (Six) Hours As Needed for Mild Pain .     • naproxen sodium (ALEVE) 220 MG tablet Take 220 mg by mouth 2 (Two) Times a Day As Needed.       No facility-administered medications prior to visit.       No opioid medication identified on active medication list. I have reviewed chart for other potential  high risk medication/s and harmful drug interactions in the  "elderly.          Aspirin is not on active medication list.  Aspirin use is not indicated based on review of current medical condition/s. Risk of harm outweighs potential benefits.  .    Patient Active Problem List   Diagnosis   • Essential hypertension   • History of thyroid nodule   • Hypercalcemia   • Personal history of skin cancer   • Primary osteoarthritis of left knee   • Vitamin D deficiency   • Acute pain of left knee   • Internal derangement of left knee   • Degenerative tear of posterior horn of medial meniscus of left knee   • S/P left knee arthroscopy     Advance Care Planning   Advance Directive is not on file.  ACP discussion was held with the patient during this visit. Patient has an advance directive (not in EMR), copy requested.    Review of Systems   Constitutional: Negative.  Negative for fatigue.   HENT: Negative.    Eyes: Negative.    Respiratory: Negative.  Negative for cough and shortness of breath.    Cardiovascular: Negative.    Gastrointestinal: Negative.    Endocrine: Negative.    Genitourinary: Negative.    Musculoskeletal: Negative.    Skin: Negative.    Allergic/Immunologic: Negative.    Neurological: Negative.    Hematological: Negative.    Psychiatric/Behavioral: The patient is nervous/anxious.         Recent concerns with anxiety with house guests.        Objective       Vitals:    09/02/21 0736 09/02/21 0759   BP: 151/87 158/88   BP Location: Right arm Right arm   Patient Position: Sitting Sitting   Cuff Size: Large Adult Adult   Pulse: 85 80   Temp: 97.1 °F (36.2 °C)    SpO2: 95%    Weight: 81 kg (178 lb 9.6 oz)    Height: 165.1 cm (65\")  Comment: pt reported    PainSc: 0-No pain      BMI Readings from Last 1 Encounters:   09/02/21 29.72 kg/m²   BMI is above normal parameters. Recommendations include: exercise counseling and nutrition counseling    Does the patient have evidence of cognitive impairment? No    Physical Exam  Vitals and nursing note reviewed.   Constitutional:       " General: She is not in acute distress.     Appearance: Normal appearance. She is well-developed. She is not ill-appearing or diaphoretic.   HENT:      Head: Normocephalic and atraumatic.   Eyes:      Conjunctiva/sclera: Conjunctivae normal.      Pupils: Pupils are equal, round, and reactive to light.   Cardiovascular:      Rate and Rhythm: Normal rate and regular rhythm.      Heart sounds: Normal heart sounds. No murmur heard.     Pulmonary:      Effort: Pulmonary effort is normal. No respiratory distress.      Breath sounds: Normal breath sounds.   Abdominal:      General: Bowel sounds are normal. There is no distension.      Palpations: Abdomen is soft.      Tenderness: There is no abdominal tenderness.   Musculoskeletal:         General: Normal range of motion.      Cervical back: Normal range of motion and neck supple.   Skin:     General: Skin is warm and dry.      Capillary Refill: Capillary refill takes less than 2 seconds.      Findings: No erythema.   Neurological:      Mental Status: She is alert and oriented to person, place, and time. Mental status is at baseline.   Psychiatric:         Mood and Affect: Mood normal.         Behavior: Behavior normal.         Thought Content: Thought content normal.         Judgment: Judgment normal.       Lab Results   Component Value Date    GLU 94 2021    CHLPL 228 (H) 2021    TRIG 114 2021    HDL 58 2021     (H) 2021    VLDL 20 2021            HEALTH RISK ASSESSMENT    Smoking Status:  Social History     Tobacco Use   Smoking Status Former Smoker   • Packs/day: 0.25   • Years: 20.00   • Pack years: 5.00   • Types: Cigarettes   • Quit date:    • Years since quittin.6   Smokeless Tobacco Never Used     Alcohol Consumption:  Social History     Substance and Sexual Activity   Alcohol Use Yes   • Alcohol/week: 6.0 - 7.0 standard drinks   • Types: 6 - 7 Glasses of wine per week     Fall Risk Screen:    STEADI Fall Risk  Assessment was completed, and patient is at LOW risk for falls.Assessment completed on:9/2/2021    Depression Screening:  PHQ-2/PHQ-9 Depression Screening 9/2/2021   Little interest or pleasure in doing things 0   Feeling down, depressed, or hopeless 0   Trouble falling or staying asleep, or sleeping too much -   Feeling tired or having little energy -   Poor appetite or overeating -   Feeling bad about yourself - or that you are a failure or have let yourself or your family down -   Trouble concentrating on things, such as reading the newspaper or watching television -   Moving or speaking so slowly that other people could have noticed. Or the opposite - being so fidgety or restless that you have been moving around a lot more than usual -   Thoughts that you would be better off dead, or of hurting yourself in some way -   Total Score 0       Health Habits and Functional and Cognitive Screening:  Functional & Cognitive Status 9/2/2021   Do you have difficulty preparing food and eating? No   Do you have difficulty bathing yourself, getting dressed or grooming yourself? No   Do you have difficulty using the toilet? No   Do you have difficulty moving around from place to place? No   Do you have trouble with steps or getting out of a bed or a chair? No   Current Diet Unhealthy Diet   Dental Exam Up to date   Eye Exam Not up to date   Exercise (times per week) 0 times per week   Current Exercises Include No Regular Exercise   Current Exercise Activities Include -   Do you need help using the phone?  No   Are you deaf or do you have serious difficulty hearing?  No   Do you need help with transportation? No   Do you need help shopping? No   Do you need help preparing meals?  No   Do you need help with housework?  No   Do you need help with laundry? No   Do you need help taking your medications? No   Do you need help managing money? No   Do you ever drive or ride in a car without wearing a seat belt? No   Have you felt  unusual stress, anger or loneliness in the last month? No   Who do you live with? Spouse   If you need help, do you have trouble finding someone available to you? No   Have you been bothered in the last four weeks by sexual problems? No   Do you have difficulty concentrating, remembering or making decisions? No       Age-appropriate Screening Schedule:  Refer to the list below for future screening recommendations based on patient's age, sex and/or medical conditions. Orders for these recommended tests are listed in the plan section. The patient has been provided with a written plan.    Health Maintenance   Topic Date Due   • DXA SCAN  08/21/2022 (Originally 8/27/2021)   • INFLUENZA VACCINE  10/01/2021   • LIPID PANEL  08/31/2022   • MAMMOGRAM  08/30/2023   • ZOSTER VACCINE  Completed   • TDAP/TD VACCINES  Discontinued              Assessment/Plan     CMS Preventative Services Quick Reference  Risk Factors Identified During Encounter  Obesity/Overweight   The above risks/problems have been discussed with the patient.  Follow up actions/plans if indicated are seen below in the Assessment/Plan Section.  Pertinent information has been shared with the patient in the After Visit Summary.    Diagnoses and all orders for this visit:    1. Medicare annual wellness visit, subsequent (Primary)    2. Situational anxiety  -     busPIRone (BUSPAR) 10 MG tablet; Take 1 tablet by mouth 3 (Three) Times a Day As Needed (anxiety).  Dispense: 30 tablet; Refill: 2    3. Essential hypertension  -     losartan (Cozaar) 25 MG tablet; Take 1 tablet by mouth Daily.  Dispense: 30 tablet; Refill: 0    4. BMI 29.0-29.9,adult    5. Elevated LDL cholesterol level    Patient encouraged to continue healthy dietary habits with lots of fresh fruits and vegetables as well as lean proteins.  Patient encouraged to participate in daily exercise with goal of 30 min sustained activity.    Follow Up:   Return in about 6 months (around 3/2/2022) for Next  scheduled follow up.     An After Visit Summary and PPPS were given to the patient.

## 2021-09-13 DIAGNOSIS — I10 ESSENTIAL HYPERTENSION: ICD-10-CM

## 2021-09-13 NOTE — TELEPHONE ENCOUNTER
Caller: Aide Maxwell    Relationship: Self    Best call back number: [phone]    Medication needed:   Requested Prescriptions     Pending Prescriptions Disp Refills   • losartan (Cozaar) 25 MG tablet 30 tablet 0     Sig: Take 1 tablet by mouth Daily.       When do you need the refill by: PATIENT HAS TAKEN 2 WEEKS OF MEDICATION AND IS DOING FINE.    What additional details did the patient provide when requesting the medication: 90 DAYS SUPPLY    Does the patient have less than a 3 day supply:  [] Yes  [] No    What is the patient's preferred pharmacy: [facility] - [facility] [address] [facility] [place] - [phone]  - [phone] FX

## 2021-09-14 RX ORDER — LOSARTAN POTASSIUM 25 MG/1
25 TABLET ORAL DAILY
Qty: 90 TABLET | Refills: 3 | Status: SHIPPED | OUTPATIENT
Start: 2021-09-14 | End: 2022-03-03

## 2021-10-27 ENCOUNTER — TELEPHONE (OUTPATIENT)
Dept: FAMILY MEDICINE CLINIC | Facility: CLINIC | Age: 71
End: 2021-10-27

## 2021-10-27 NOTE — TELEPHONE ENCOUNTER
Pt rec'd Fluzone HD PF Inj at [facility] 10/27/21.  NDC [id number]  EXP 6/30/22  Lot [id number]  Please update HM.

## 2021-11-10 ENCOUNTER — TELEPHONE (OUTPATIENT)
Dept: ORTHOPEDIC SURGERY | Facility: CLINIC | Age: 71
End: 2021-11-10

## 2021-11-10 NOTE — TELEPHONE ENCOUNTER
DR REYES.  PATIENT CALLED REQUESTING AN ORDER FOR A RIGHT THUMB TRIGGER FINGER RELEASE.  SHE IS STARTING TO HAVE PAIN AGAIN AFTER THE INJECTION DONE ON LAST VISIT.

## 2021-12-14 ENCOUNTER — PROCEDURE VISIT (OUTPATIENT)
Dept: ORTHOPEDIC SURGERY | Facility: CLINIC | Age: 71
End: 2021-12-14

## 2021-12-14 VITALS — WEIGHT: 182 LBS | BODY MASS INDEX: 30.32 KG/M2 | HEIGHT: 65 IN

## 2021-12-14 DIAGNOSIS — M65.311 TRIGGER THUMB, RIGHT THUMB: Primary | ICD-10-CM

## 2021-12-14 DIAGNOSIS — M79.644 PAIN OF RIGHT THUMB: ICD-10-CM

## 2021-12-14 PROCEDURE — 26055 INCISE FINGER TENDON SHEATH: CPT | Performed by: ORTHOPAEDIC SURGERY

## 2021-12-14 NOTE — PROGRESS NOTES
"Aide Maxwell is a 71 y.o. female returns for     Chief Complaint   Patient presents with   • Right Thumb - Follow-up       HISTORY OF PRESENT ILLNESS:  Patient being seen for right thumb trigger finger release.        CONCURRENT MEDICAL HISTORY:    The following portions of the patient's history were reviewed and updated as appropriate: allergies, current medications, past family history, past medical history, past social history, past surgical history and problem list.     ROS  No fevers or chills.  No chest pain or shortness of air.  No GI or  disturbances.    PHYSICAL EXAMINATION:       Ht 165.1 cm (65\")   Wt 82.6 kg (182 lb)   BMI 30.29 kg/m²         No results found.    Procedure:  XR HAND 3+ VW RIGHT  Exam Date:  7/29/21     COMPARISON:  Not applicable, no relevant images available.     IMPRESSION: 3 views of the right wrist show acceptable position alignment with no evidence of acute bony normality.  No fracture or dislocation is noted.  No significant arthritic changes noted.        Jamil Lee MD  7/29/21         ASSESSMENT:    Diagnoses and all orders for this visit:    Trigger thumb, right thumb    Pain of right thumb          PLAN     After Appropriate risk and benefit discussion with the patient, the right thumb was marked and the right hand was prepped and draped. The tourniquet was applied to the upper arm.  Local anesthetic 1% Lidocaine was injected in the area of the a-1 pulley.  After compression of the hand and elevation,  the tourniquet was inflated to 250 mmHg.  The incision was made in the flexor skin crease of the right thumb. The  A1 pulley was identified and released.  The patient had active flexion and extension without triggering.  The wound was closed with interrupted 4-0 nylon suture.  Sterile dressing was applied. Tourniquet was released and circulation returned immediately to the hand. The patient was discharged to follow-up in 10 days for suture removal.    Return " in about 10 days (around 12/24/2021).    Jamil Lee MD

## 2022-03-03 ENCOUNTER — OFFICE VISIT (OUTPATIENT)
Dept: FAMILY MEDICINE CLINIC | Facility: CLINIC | Age: 72
End: 2022-03-03

## 2022-03-03 VITALS
WEIGHT: 179 LBS | TEMPERATURE: 98.2 F | BODY MASS INDEX: 29.82 KG/M2 | HEIGHT: 65 IN | HEART RATE: 87 BPM | SYSTOLIC BLOOD PRESSURE: 147 MMHG | DIASTOLIC BLOOD PRESSURE: 80 MMHG | OXYGEN SATURATION: 98 %

## 2022-03-03 DIAGNOSIS — R60.0 MILD PERIPHERAL EDEMA: ICD-10-CM

## 2022-03-03 DIAGNOSIS — I10 ESSENTIAL HYPERTENSION: Primary | ICD-10-CM

## 2022-03-03 DIAGNOSIS — M25.562 CHRONIC PAIN OF BOTH KNEES: ICD-10-CM

## 2022-03-03 DIAGNOSIS — G89.29 CHRONIC PAIN OF BOTH KNEES: ICD-10-CM

## 2022-03-03 DIAGNOSIS — M25.561 CHRONIC PAIN OF BOTH KNEES: ICD-10-CM

## 2022-03-03 PROCEDURE — 99214 OFFICE O/P EST MOD 30 MIN: CPT | Performed by: NURSE PRACTITIONER

## 2022-03-03 RX ORDER — LOSARTAN POTASSIUM 50 MG/1
50 TABLET ORAL DAILY
Qty: 90 TABLET | Refills: 1
Start: 2022-03-03 | End: 2022-09-06

## 2022-03-03 NOTE — PROGRESS NOTES
"Chief Complaint  Hypertension (6mth FU)    Subjective          Aide Maxwell presents to [facility]  History of Present Illness  HTN:  Patient states when she takes her b/p at home it typically is in the upper 120's-130's/high 80's.  She is asymptomatic in regard to her blood pressure.  She does note occasional peripheral swelling, typically noted after eating a heavy sodium meal or when it is hot outside.  KNEE PAIN:  This is a chronic problem.  She has previously seen ortho for her degenerative changes.  She takes ibuprofen or naproxen PRN when they really hurt.  She inquires if this is still ok to do.  Objective   Vital Signs:   /80 (BP Location: Right arm, Patient Position: Sitting, Cuff Size: Large Adult)   Pulse 87   Temp 98.2 °F (36.8 °C)   Ht 165.1 cm (65\") Comment: pt reported  Wt 81.2 kg (179 lb)   SpO2 98%   BMI 29.79 kg/m²     Physical Exam  Vitals and nursing note reviewed.   Constitutional:       General: She is not in acute distress.     Appearance: Normal appearance. She is not ill-appearing.   HENT:      Head: Normocephalic and atraumatic.   Cardiovascular:      Rate and Rhythm: Normal rate and regular rhythm.      Pulses: Normal pulses.      Heart sounds: Normal heart sounds. No murmur heard.      Pulmonary:      Effort: Pulmonary effort is normal.      Breath sounds: Normal breath sounds.   Musculoskeletal:      Right lower leg: No edema.      Left lower leg: No edema.   Skin:     General: Skin is warm and dry.   Neurological:      Mental Status: She is alert and oriented to person, place, and time.   Psychiatric:         Thought Content: Thought content normal.        Result Review :                 Assessment and Plan    Diagnoses and all orders for this visit:    1. Essential hypertension (Primary)  -     losartan (Cozaar) 50 MG tablet; Take 1 tablet by mouth Daily.  Dispense: 90 tablet; Refill: 1    2. Mild peripheral edema    3. Chronic pain " of both knees    Patient states that her  recently had his b/p med changed and he has a brand new 90 day bottle of losartan/HCTZ (50/12.5) at home.  We discussed this and she will try this to see if it is beneficial.  She agrees to call should she want her losartan 50 mg changed to include the HCTZ, which will be a great alternative.    Follow Up   Return in about 6 months (around 9/5/2022) for Medicare Wellness with labs prior.  Patient was given instructions and counseling regarding her condition or for health maintenance advice. Please see specific information pulled into the AVS if appropriate.

## 2022-03-04 ENCOUNTER — PATIENT ROUNDING (BHMG ONLY) (OUTPATIENT)
Dept: FAMILY MEDICINE CLINIC | Facility: CLINIC | Age: 72
End: 2022-03-04

## 2022-03-04 NOTE — PROGRESS NOTES
March 4, 2022    Hello, may I speak with Aide Maxwell?    My name is Maggie    I am  with [facility]  [address] [address] [phone].    Before we get started may I verify your date of birth? 1950    I am calling to officially welcome you to our practice and ask about your recent visit. Is this a good time to talk? No answer, LVM requesting call back.     Tell me about your visit with us. What things went well?        We're always looking for ways to make our patients' experiences even better. Do you have recommendations on ways we may improve?      Overall were you satisfied with your first visit to our practice?        I appreciate you taking the time to speak with me today. Is there anything else I can do for you?       Thank you, and have a great day.

## 2022-09-06 ENCOUNTER — OFFICE VISIT (OUTPATIENT)
Dept: FAMILY MEDICINE CLINIC | Facility: CLINIC | Age: 72
End: 2022-09-06

## 2022-09-06 VITALS
TEMPERATURE: 97.5 F | HEART RATE: 76 BPM | DIASTOLIC BLOOD PRESSURE: 88 MMHG | SYSTOLIC BLOOD PRESSURE: 138 MMHG | WEIGHT: 172 LBS | BODY MASS INDEX: 28.66 KG/M2 | HEIGHT: 65 IN | OXYGEN SATURATION: 99 %

## 2022-09-06 DIAGNOSIS — E66.3 OVERWEIGHT WITH BODY MASS INDEX (BMI) OF 28 TO 28.9 IN ADULT: ICD-10-CM

## 2022-09-06 DIAGNOSIS — I10 ESSENTIAL HYPERTENSION: ICD-10-CM

## 2022-09-06 DIAGNOSIS — E78.00 ELEVATED LDL CHOLESTEROL LEVEL: ICD-10-CM

## 2022-09-06 DIAGNOSIS — Z12.31 BREAST CANCER SCREENING BY MAMMOGRAM: ICD-10-CM

## 2022-09-06 DIAGNOSIS — Z00.00 MEDICARE ANNUAL WELLNESS VISIT, SUBSEQUENT: Primary | ICD-10-CM

## 2022-09-06 DIAGNOSIS — Z78.0 POST-MENOPAUSAL: ICD-10-CM

## 2022-09-06 PROCEDURE — 1159F MED LIST DOCD IN RCRD: CPT | Performed by: NURSE PRACTITIONER

## 2022-09-06 PROCEDURE — G0439 PPPS, SUBSEQ VISIT: HCPCS | Performed by: NURSE PRACTITIONER

## 2022-09-06 PROCEDURE — 1126F AMNT PAIN NOTED NONE PRSNT: CPT | Performed by: NURSE PRACTITIONER

## 2022-09-06 PROCEDURE — 1170F FXNL STATUS ASSESSED: CPT | Performed by: NURSE PRACTITIONER

## 2022-09-06 RX ORDER — LOSARTAN POTASSIUM 25 MG/1
25 TABLET ORAL DAILY
Qty: 90 TABLET | Refills: 1
Start: 2022-09-06

## 2022-09-06 NOTE — PROGRESS NOTES
Subsequent Medicare Wellness Visit    Chief Complaint   Patient presents with   • Medicare Wellness-subsequent        Subjective    History of Present Illness:  Aide Maxwell is a 71 y.o. female who presents for a Subsequent Medicare Wellness Visit.    Recent episode of vertigo, lasted 3 days.  She could not lie down during that time but since then has been fine.  She went through testing years ago and was formally diagnosed.    The following portions of the patient's history were reviewed and   updated as appropriate: allergies, current medications, past family history, past medical history, past social history, past surgical history and problem list.    Compared to one year ago, the patient feels her physical   health is better.  She has lost weight, made some lifestyle changes.    Compared to one year ago, the patient feels her mental   health is the same.    Recent Hospitalizations:  She was not admitted to the hospital during the last year.       Current Medical Providers:  Patient Care Team:  Josseline Garcia APRN as PCP - General (Family Medicine)    Outpatient Medications Prior to Visit   Medication Sig Dispense Refill   • acetaminophen (TYLENOL) 500 MG tablet Take 500 mg by mouth Every 6 (Six) Hours As Needed for Mild Pain .     • busPIRone (BUSPAR) 10 MG tablet Take 1 tablet by mouth 3 (Three) Times a Day As Needed (anxiety). 30 tablet 2   • Fexofenadine HCl (ALLEGRA ALLERGY PO) Take 1 tablet by mouth As Needed.     • fluticasone (FLONASE) 50 MCG/ACT nasal spray 2 sprays into the nostril(s) as directed by provider As Needed for Rhinitis.     • hydrocortisone (WESTCORT) 0.2 % cream Apply  topically to the appropriate area as directed 2 (Two) Times a Day. (Patient taking differently: Apply  topically to the appropriate area as directed 2 (Two) Times a Day As Needed.) 60 g 2   • ibuprofen (ADVIL,MOTRIN) 200 MG tablet Take 200 mg by mouth Every 6 (Six) Hours As Needed for Mild Pain .     • naproxen  "sodium (ALEVE) 220 MG tablet Take 220 mg by mouth 2 (Two) Times a Day As Needed.     • losartan (Cozaar) 50 MG tablet Take 1 tablet by mouth Daily. 90 tablet 1     No facility-administered medications prior to visit.       No opioid medication identified on active medication list. I have reviewed chart for other potential  high risk medication/s and harmful drug interactions in the elderly.              Patient Active Problem List   Diagnosis   • Essential hypertension   • History of thyroid nodule   • Hypercalcemia   • Personal history of skin cancer   • Primary osteoarthritis of left knee   • Vitamin D deficiency   • Acute pain of left knee   • Internal derangement of left knee   • Degenerative tear of posterior horn of medial meniscus of left knee   • S/P left knee arthroscopy     Advance Care Planning  Advance Directive is not on file.  ACP discussion was held with the patient during this visit. Patient has an advance directive (not in EMR), copy requested.    Review of Systems   Constitutional: Negative.    HENT: Negative.    Respiratory: Negative.  Negative for cough and shortness of breath.    Cardiovascular: Negative.  Negative for chest pain and leg swelling.   Gastrointestinal: Negative.  Negative for constipation and diarrhea.   Genitourinary: Negative.    Musculoskeletal: Negative.    Neurological: Negative.    Psychiatric/Behavioral: Negative.         Objective    Vitals:    09/06/22 0736 09/06/22 0806   BP: 142/96 138/88   BP Location: Right arm Left arm   Patient Position: Sitting Sitting   Cuff Size: Adult Adult   Pulse: 89 76   Temp: 97.5 °F (36.4 °C)    SpO2: 99%    Weight: 78 kg (172 lb)    Height: 165.1 cm (65\")  Comment: pt reported    PainSc: 0-No pain      Estimated body mass index is 28.62 kg/m² as calculated from the following:    Height as of this encounter: 165.1 cm (65\").    Weight as of this encounter: 78 kg (172 lb).    BMI is >= 25 and <30. (Overweight) The following options were " offered after discussion;: exercise counseling/recommendations and nutrition counseling/recommendations         Does the patient have evidence of cognitive impairment? No    Physical Exam  Vitals and nursing note reviewed.   Constitutional:       General: She is not in acute distress.     Appearance: Normal appearance. She is not ill-appearing.   HENT:      Head: Normocephalic and atraumatic.   Neck:      Vascular: No carotid bruit.   Cardiovascular:      Rate and Rhythm: Normal rate and regular rhythm.      Pulses: Normal pulses.      Heart sounds: Normal heart sounds. No murmur heard.  Pulmonary:      Effort: Pulmonary effort is normal.      Breath sounds: Normal breath sounds.   Abdominal:      General: Bowel sounds are normal.      Palpations: Abdomen is soft.   Musculoskeletal:         General: Normal range of motion.      Cervical back: Neck supple.      Right lower leg: No edema.      Left lower leg: No edema.   Lymphadenopathy:      Cervical: No cervical adenopathy.   Skin:     General: Skin is warm and dry.   Neurological:      Mental Status: She is alert and oriented to person, place, and time.   Psychiatric:         Thought Content: Thought content normal.       Lab Results   Component Value Date    CHLPL 206 (H) 2022    TRIG 119 2022    HDL 54 2022     (H) 2022    VLDL 21 2022            HEALTH RISK ASSESSMENT    Smoking Status:  Social History     Tobacco Use   Smoking Status Former Smoker   • Packs/day: 0.25   • Years: 20.00   • Pack years: 5.00   • Types: Cigarettes   • Quit date:    • Years since quittin.6   Smokeless Tobacco Never Used     Alcohol Consumption:  Social History     Substance and Sexual Activity   Alcohol Use Yes   • Alcohol/week: 10.0 standard drinks   • Types: 10 Glasses of wine per week    Comment: wine daily     Fall Risk Screen:    STEADI Fall Risk Assessment was completed, and patient is at LOW risk for falls.Assessment completed  on:9/6/2022    Depression Screening:  PHQ-2/PHQ-9 Depression Screening 9/6/2022   Retired PHQ-9 Total Score -   Retired Total Score -   Little Interest or Pleasure in Doing Things 0-->not at all   Feeling Down, Depressed or Hopeless 0-->not at all   PHQ-9: Brief Depression Severity Measure Score 0       Health Habits and Functional and Cognitive Screening:  Functional & Cognitive Status 9/6/2022   Do you have difficulty preparing food and eating? No   Do you have difficulty bathing yourself, getting dressed or grooming yourself? No   Do you have difficulty using the toilet? No   Do you have difficulty moving around from place to place? No   Do you have trouble with steps or getting out of a bed or a chair? No   Current Diet Low Carb Diet   Dental Exam Up to date   Eye Exam Up to date   Exercise (times per week) 0 times per week   Current Exercises Include No Regular Exercise   Current Exercise Activities Include -   Do you need help using the phone?  No   Are you deaf or do you have serious difficulty hearing?  No   Do you need help with transportation? No   Do you need help shopping? No   Do you need help preparing meals?  No   Do you need help with housework?  No   Do you need help with laundry? No   Do you need help taking your medications? No   Do you need help managing money? No   Do you ever drive or ride in a car without wearing a seat belt? No   Have you felt unusual stress, anger or loneliness in the last month? No   Who do you live with? Spouse   If you need help, do you have trouble finding someone available to you? No   Have you been bothered in the last four weeks by sexual problems? No   Do you have difficulty concentrating, remembering or making decisions? No       Age-appropriate Screening Schedule:  Refer to the list below for future screening recommendations based on patient's age, sex and/or medical conditions. Orders for these recommended tests are listed in the plan section. The patient has been  provided with a written plan.    Health Maintenance   Topic Date Due   • DXA SCAN  08/27/2021   • INFLUENZA VACCINE  10/01/2022   • MAMMOGRAM  08/30/2023   • LIPID PANEL  09/01/2023   • ZOSTER VACCINE  Completed   • TDAP/TD VACCINES  Discontinued              Assessment & Plan   CMS Preventative Services Quick Reference  Risk Factors Identified During Encounter  Obesity/Overweight   The above risks/problems have been discussed with the patient.  Follow up actions/plans if indicated are seen below in the Assessment/Plan Section.  Pertinent information has been shared with the patient in the After Visit Summary.    Diagnoses and all orders for this visit:    1. Medicare annual wellness visit, subsequent (Primary)    2. Essential hypertension  -     losartan (Cozaar) 25 MG tablet; Take 1 tablet by mouth Daily.  Dispense: 90 tablet; Refill: 1    3. Elevated LDL cholesterol level    4. Breast cancer screening by mammogram  -     Mammo Screening Digital Tomosynthesis Bilateral With CAD; Future    5. Post-menopausal  -     DEXA Bone Density Axial; Future    6. Overweight with body mass index (BMI) of 28 to 28.9 in adult    Patient encouraged to continue low carb diet planning rich in fresh vegetables and lean proteins.  Patient encouraged to participate in daily exercise with goal of 30 min sustained activity.    Follow Up:   Return in about 1 year (around 9/6/2023) for Medicare Wellness with labs prior.     An After Visit Summary and PPPS were made available to the patient.                   ALEXIS Hunter   This note is electronically signed.

## 2022-09-16 ENCOUNTER — HOSPITAL ENCOUNTER (OUTPATIENT)
Dept: BONE DENSITY | Facility: HOSPITAL | Age: 72
Discharge: HOME | End: 2022-09-16

## 2022-09-16 ENCOUNTER — HOSPITAL ENCOUNTER (OUTPATIENT)
Dept: MAMMOGRAPHY | Facility: HOSPITAL | Age: 72
Discharge: HOME | End: 2022-09-16

## 2022-09-16 DIAGNOSIS — Z12.31 BREAST CANCER SCREENING BY MAMMOGRAM: ICD-10-CM

## 2022-09-16 DIAGNOSIS — Z78.0 POST-MENOPAUSAL: ICD-10-CM

## 2022-09-16 PROCEDURE — 77063 BREAST TOMOSYNTHESIS BI: CPT

## 2022-09-16 PROCEDURE — 77067 SCR MAMMO BI INCL CAD: CPT

## 2022-09-16 PROCEDURE — 77080 DXA BONE DENSITY AXIAL: CPT

## 2023-06-12 ENCOUNTER — OFFICE VISIT (OUTPATIENT)
Dept: SURGERY | Facility: CLINIC | Age: 73
End: 2023-06-12
Payer: MEDICARE

## 2023-06-12 VITALS
SYSTOLIC BLOOD PRESSURE: 136 MMHG | DIASTOLIC BLOOD PRESSURE: 82 MMHG | HEART RATE: 76 BPM | HEIGHT: 65 IN | WEIGHT: 172 LBS | BODY MASS INDEX: 28.66 KG/M2

## 2023-06-12 DIAGNOSIS — Z01.818 PRE-OP EVALUATION: Primary | ICD-10-CM

## 2023-06-12 DIAGNOSIS — E66.3 OVERWEIGHT WITH BODY MASS INDEX (BMI) OF 28 TO 28.9 IN ADULT: ICD-10-CM

## 2023-06-12 DIAGNOSIS — C43.59 MALIGNANT MELANOMA OF BACK: ICD-10-CM

## 2023-06-12 PROCEDURE — 1160F RVW MEDS BY RX/DR IN RCRD: CPT | Performed by: STUDENT IN AN ORGANIZED HEALTH CARE EDUCATION/TRAINING PROGRAM

## 2023-06-12 PROCEDURE — 3079F DIAST BP 80-89 MM HG: CPT | Performed by: STUDENT IN AN ORGANIZED HEALTH CARE EDUCATION/TRAINING PROGRAM

## 2023-06-12 PROCEDURE — 99204 OFFICE O/P NEW MOD 45 MIN: CPT | Performed by: STUDENT IN AN ORGANIZED HEALTH CARE EDUCATION/TRAINING PROGRAM

## 2023-06-12 PROCEDURE — 3075F SYST BP GE 130 - 139MM HG: CPT | Performed by: STUDENT IN AN ORGANIZED HEALTH CARE EDUCATION/TRAINING PROGRAM

## 2023-06-12 PROCEDURE — 1159F MED LIST DOCD IN RCRD: CPT | Performed by: STUDENT IN AN ORGANIZED HEALTH CARE EDUCATION/TRAINING PROGRAM

## 2023-06-12 RX ORDER — HEPARIN SODIUM 5000 [USP'U]/ML
5000 INJECTION, SOLUTION INTRAVENOUS; SUBCUTANEOUS ONCE
Status: CANCELLED | OUTPATIENT
Start: 2023-06-12 | End: 2023-06-12

## 2023-06-12 NOTE — PATIENT INSTRUCTIONS
Surgery is scheduled for 6/19 at 1000 am.  Prework is scheduled for 6/15 at 1245 pm.  Nothing to eat  after midnight before surgery.  Clear liquids until 8 am day of surgery .  No Aspirin, Vitamins or Blood Thinners 5 days prior to surgery.  Please report to the hospital main registation for check in on both days.     BMI for Adults  What is BMI?  Body mass index (BMI) is a number that is calculated from a person's weight and height. BMI can help estimate how much of a person's weight is composed of fat. BMI does not measure body fat directly. Rather, it is an alternative to procedures that directly measure body fat, which can be difficult and expensive.  BMI can help identify people who may be at higher risk for certain medical problems.  What are BMI measurements used for?  BMI is used as a screening tool to identify possible weight problems. It helps determine whether a person is obese, overweight, a healthy weight, or underweight.  BMI is useful for:  Identifying a weight problem that may be related to a medical condition or may increase the risk for medical problems.  Promoting changes, such as changes in diet and exercise, to help reach a healthy weight. BMI screening can be repeated to see if these changes are working.  How is BMI calculated?  BMI involves measuring your weight in relation to your height. Both height and weight are measured, and the BMI is calculated from those numbers. This can be done either in English (U[place] or metric measurements. Note that charts and online BMI calculators are available to help you find your BMI quickly and easily without having to do these calculations yourself.  To calculate your BMI in English (U.S[place] measurements:    Measure your weight in pounds (lb).  Multiply the number of pounds by 703.  For example, for a person who weighs 180 lb, multiply that number by 703, which equals 126,540.  Measure your height in inches. Then multiply that number by itself to get a  "measurement called \"inches squared.\"  For example, for a person who is 70 inches tall, the \"inches squared\" measurement is 70 inches x 70 inches, which equals 4,900 inches squared.  Divide the total from step 2 (number of lb x 703) by the total from step 3 (inches squared): 126,540 ÷ 4,900 = 25.8. This is your BMI.    To calculate your BMI in metric measurements:  Measure your weight in kilograms (kg).  Measure your height in meters (m). Then multiply that number by itself to get a measurement called \"meters squared.\"  For example, for a person who is 1.75 m tall, the \"meters squared\" measurement is 1.75 m x 1.75 m, which is equal to 3.1 meters squared.  Divide the number of kilograms (your weight) by the meters squared number. In this example: 70 ÷ 3.1 = 22.6. This is your BMI.  What do the results mean?  BMI charts are used to identify whether you are underweight, normal weight, overweight, or obese. The following guidelines will be used:  Underweight: BMI less than 18.5.  Normal weight: BMI between 18.5 and 24.9.  Overweight: BMI between 25 and 29.9.  Obese: BMI of 30 or above.  Keep these notes in mind:  Weight includes both fat and muscle, so someone with a muscular build, such as an athlete, may have a BMI that is higher than 24.9. In cases like these, BMI is not an accurate measure of body fat.  To determine if excess body fat is the cause of a BMI of 25 or higher, further assessments may need to be done by a health care provider.  BMI is usually interpreted in the same way for men and women.  Where to find more information  For more information about BMI, including tools to quickly calculate your BMI, go to these websites:  Centers for Disease Control and Prevention: www.cdc.gov  American Heart Association: www.heart.org  National Heart, Lung, and Blood [place]: www.nhlbi.nih.gov  Summary  Body mass index (BMI) is a number that is calculated from a person's weight and height.  BMI may help estimate how " much of a person's weight is composed of fat. BMI can help identify those who may be at higher risk for certain medical problems.  BMI can be measured using English measurements or metric measurements.  BMI charts are used to identify whether you are underweight, normal weight, overweight, or obese.  This information is not intended to replace advice given to you by your health care provider. Make sure you discuss any questions you have with your health care provider.  Document Revised: 09/09/2020 Document Reviewed: 07/17/2020  Elsevier Patient Education © 2021 [employer]

## 2023-06-12 NOTE — PROGRESS NOTES
Office New Patient History and Physical:     Referring Provider: No ref. provider found    Chief Complaint   Patient presents with   • Melanoma     Mrs. Maxewll is here for melanoma on her back.        Subjective .     History of present illness:  Aide Maxwell is a 72 y.o. female who presents with a biopsy proven melanoma. No personal history of melanoma, her father did have melanoma. She has had basal cells and a squamous cell removed. She does wear sunscreen intermittently. BMI is 28. She is a non-smoker. She is not on blood thinners.     History  Past Medical History:   Diagnosis Date   • Allergic    • Breast cancer     right   • Eczema    • Hx of radiation therapy    ,   Past Surgical History:   Procedure Laterality Date   • BREAST BIOPSY Right     cancer   • BREAST LUMPECTOMY Right    • COLONOSCOPY     • KNEE ARTHROSCOPY Left 2021    Procedure: arthroscopy of left knee with debridement of medial and lateral meniscus;  Surgeon: Jamil Lee MD;  Location: [facility];  Service: Orthopedics;  Laterality: Left;   • KNEE SURGERY Left    • PARATHYROIDECTOMY     • THYROIDECTOMY, PARTIAL Right    • TUBAL ABDOMINAL LIGATION     ,   Family History   Problem Relation Age of Onset   • Dementia Mother    • Heart disease Father    • Leukemia Brother    • Prostate cancer Brother    • Cancer Other    • Hypertension Other    • Breast cancer Neg Hx    ,   Social History     Tobacco Use   • Smoking status: Former     Packs/day: 0.25     Years: 20.00     Pack years: 5.00     Types: Cigarettes     Quit date:      Years since quittin.4   • Smokeless tobacco: Never   Vaping Use   • Vaping Use: Never used   Substance Use Topics   • Alcohol use: Yes     Alcohol/week: 10.0 standard drinks     Types: 10 Glasses of wine per week     Comment: wine daily   • Drug use: No   , (Not in a hospital admission)   and Allergies:  Patient has no known allergies.    Current Outpatient Medications:   •   "acetaminophen (TYLENOL) 500 MG tablet, Take 1 tablet by mouth Every 6 (Six) Hours As Needed for Mild Pain., Disp: , Rfl:   •  busPIRone (BUSPAR) 10 MG tablet, Take 1 tablet by mouth 3 (Three) Times a Day As Needed (anxiety)., Disp: 30 tablet, Rfl: 2  •  Fexofenadine HCl (ALLEGRA ALLERGY PO), Take 1 tablet by mouth As Needed., Disp: , Rfl:   •  fluticasone (FLONASE) 50 MCG/ACT nasal spray, 2 sprays into the nostril(s) as directed by provider As Needed for Rhinitis., Disp: , Rfl:   •  hydrocortisone (WESTCORT) 0.2 % cream, Apply  topically to the appropriate area as directed 2 (Two) Times a Day. (Patient taking differently: Apply  topically to the appropriate area as directed 2 (Two) Times a Day As Needed.), Disp: 60 g, Rfl: 2  •  ibuprofen (ADVIL,MOTRIN) 200 MG tablet, Take 1 tablet by mouth Every 6 (Six) Hours As Needed for Mild Pain., Disp: , Rfl:   •  losartan (Cozaar) 25 MG tablet, Take 1 tablet by mouth Daily., Disp: 90 tablet, Rfl: 1  •  naproxen sodium (ALEVE) 220 MG tablet, Take 1 tablet by mouth 2 (Two) Times a Day As Needed., Disp: , Rfl:     Objective     Vital Signs   /82 (BP Location: Left arm, Patient Position: Sitting, Cuff Size: Adult)   Pulse 76   Ht 165.1 cm (65\")   Wt 78 kg (172 lb)   BMI 28.62 kg/m²      Physical Exam:  General appearance - alert, well appearing, and in no distress  Mental status - alert, oriented to person, place, and time  Eyes - pupils equal and reactive, extraocular eye movements intact  Neck - supple, no significant adenopathy  Chest - no tachypnea, retractions or cyanosis  Heart - normal rate and regular rhythm  Abdomen - soft, nontender, nondistended, no masses or organomegaly  Neurological - alert, oriented, normal speech, no focal findings or movement disorder noted  Musculoskeletal - no joint tenderness, deformity or swelling  Skin - biopsy site well healed on upper back.     Results Review:    The following data was reviewed by: Earnestine Verde MD on " 06/12/2023:    REFERRAL/PRE-AUTH MRN - SCAN (05/25/2023)   Pathology report: Upper right back - invasive melanoma 0.6 mm depth, no ulceration, mitotic index 1 per sq mm, no lymphovascular invasion, no regression, pT1a with negative margins by 1.8 mm.     Assessment & Plan       Diagnoses and all orders for this visit:    1. Pre-op evaluation (Primary)  Overview:  Added automatically from request for surgery [id number]    Orders:  -     Case Request; Standing  -     XR chest 1 vw; Future  -     ECG 12 Lead; Future  -     ceFAZolin (ANCEF) 2 g in sodium chloride 0.9 % 100 mL IVPB  -     heparin (porcine) 5000 UNIT/ML injection 5,000 Units  -     CBC & Differential; Future  -     Comprehensive Metabolic Panel; Future  -     Case Request    2. Malignant melanoma of back    3. Overweight with body mass index (BMI) of 28 to 28.9 in adult    Other orders  -     Follow Anesthesia Guidelines / Protocol; Future  -     Follow Anesthesia Guidelines / Protocol; Standing  -     Verify / Perform Chlorhexidine Skin Prep; Standing  -     Verify / Perform Chlorhexidine Skin Prep if Indicated (If Not Already Completed); Standing  -     Obtain Informed Consent; Future  -     Provide NPO Instructions to Patient; Future  -     Chlorhexidine Skin Prep; Future  -     Notify physician (specify); Standing  -     Instructions on coughing, deep breathing, and incentive spirometry.; Standing  -     Oxygen Therapy-; Standing     Ms. Maxwell is a 72 year old female with a biopsy proven 6mm melanoma without ulceration of the right upper back. I have recommended a wide local excision with 1 cm margins per NCCN guidelines. She does not require a sentinel lymph node biopsy per guidelines as the depth is < 0.75 mm and there is no ulceration present. We discussed the risks of surgery including bleeding, infection, damage to surrounding structures and cardiopulmonary effects of anesthesia. She is in agreement to proceed. Prework including CBC, CMP, CXR and  EKG ordered. Scheduled for wide local excision of right back melanoma on 6/19/23.     This is a life threatening diagnosis. I have reviewed the note and pathology report above. I have ordered CBC, CMP, CXR and EKG. She is at increased risk of perioperative complications 2/2 her elevated BMI.     BMI is >= 25 and <30. (Overweight) The following options were offered after discussion;: weight loss educational material (shared in after visit summary)      Earnestine Verde MD  06/13/23  11:01 CDT

## 2023-06-13 ENCOUNTER — PATIENT ROUNDING (BHMG ONLY) (OUTPATIENT)
Dept: SURGERY | Facility: CLINIC | Age: 73
End: 2023-06-13
Payer: MEDICARE

## 2023-06-13 NOTE — PROGRESS NOTES
June 13, 2023    Hello, may I speak with Aide Maxwell?    My name is Mady (Carolyn spoke with patient)       I am  with [facility] SUR[facility] GENERAL SURGERY  [address]  [place] [id number] [phone].    Before we get started may I verify your date of birth? 1950    I am calling to officially welcome you to our practice and ask about your recent visit. Is this a good time to talk? yes    Tell me about your visit with us. What things went well?  Everyone very friendly and I like Dr. Verde.       We're always looking for ways to make our patients' experiences even better. Do you have recommendations on ways we may improve?  no    Overall were you satisfied with your first visit to our practice? no       I appreciate you taking the time to speak with me today. Is there anything else I can do for you? no      Thank you, and have a great day.

## 2023-06-15 ENCOUNTER — HOSPITAL ENCOUNTER (OUTPATIENT)
Dept: GENERAL RADIOLOGY | Facility: HOSPITAL | Age: 73
Discharge: HOME | End: 2023-06-15
Payer: MEDICARE

## 2023-06-15 ENCOUNTER — PRE-ADMISSION TESTING (OUTPATIENT)
Dept: PREADMISSION TESTING | Facility: HOSPITAL | Age: 73
End: 2023-06-15
Payer: MEDICARE

## 2023-06-15 VITALS
BODY MASS INDEX: 27.7 KG/M2 | HEIGHT: 65 IN | RESPIRATION RATE: 16 BRPM | OXYGEN SATURATION: 98 % | WEIGHT: 166.23 LBS | DIASTOLIC BLOOD PRESSURE: 75 MMHG | SYSTOLIC BLOOD PRESSURE: 142 MMHG | HEART RATE: 88 BPM

## 2023-06-15 DIAGNOSIS — Z01.818 PRE-OP EVALUATION: ICD-10-CM

## 2023-06-15 LAB
ALBUMIN SERPL-MCNC: 4.4 G/DL (ref 3.5–5.2)
ALBUMIN/GLOB SERPL: 1.6 G/DL
ALP SERPL-CCNC: 69 U/L (ref 39–117)
ALT SERPL W P-5'-P-CCNC: 10 U/L (ref 1–33)
ANION GAP SERPL CALCULATED.3IONS-SCNC: 12 MMOL/L (ref 5–15)
AST SERPL-CCNC: 20 U/L (ref 1–32)
BASOPHILS # BLD AUTO: 0.04 10*3/MM3 (ref 0–0.2)
BASOPHILS NFR BLD AUTO: 0.8 % (ref 0–1.5)
BILIRUB SERPL-MCNC: 0.4 MG/DL (ref 0–1.2)
BUN SERPL-MCNC: 18 MG/DL (ref 8–23)
BUN/CREAT SERPL: 30 (ref 7–25)
CALCIUM SPEC-SCNC: 9.8 MG/DL (ref 8.6–10.5)
CHLORIDE SERPL-SCNC: 102 MMOL/L (ref 98–107)
CO2 SERPL-SCNC: 26 MMOL/L (ref 22–29)
CREAT SERPL-MCNC: 0.6 MG/DL (ref 0.57–1)
DEPRECATED RDW RBC AUTO: 46.2 FL (ref 37–54)
EGFRCR SERPLBLD CKD-EPI 2021: 94.9 ML/MIN/1.73
EOSINOPHIL # BLD AUTO: 0.15 10*3/MM3 (ref 0–0.4)
EOSINOPHIL NFR BLD AUTO: 2.9 % (ref 0.3–6.2)
ERYTHROCYTE [DISTWIDTH] IN BLOOD BY AUTOMATED COUNT: 13.5 % (ref 12.3–15.4)
GLOBULIN UR ELPH-MCNC: 2.7 GM/DL
GLUCOSE SERPL-MCNC: 94 MG/DL (ref 65–99)
HCT VFR BLD AUTO: 42 % (ref 34–46.6)
HGB BLD-MCNC: 13.5 G/DL (ref 12–15.9)
IMM GRANULOCYTES # BLD AUTO: 0.03 10*3/MM3 (ref 0–0.05)
IMM GRANULOCYTES NFR BLD AUTO: 0.6 % (ref 0–0.5)
LYMPHOCYTES # BLD AUTO: 1.59 10*3/MM3 (ref 0.7–3.1)
LYMPHOCYTES NFR BLD AUTO: 30.6 % (ref 19.6–45.3)
MCH RBC QN AUTO: 29.9 PG (ref 26.6–33)
MCHC RBC AUTO-ENTMCNC: 32.1 G/DL (ref 31.5–35.7)
MCV RBC AUTO: 92.9 FL (ref 79–97)
MONOCYTES # BLD AUTO: 0.45 10*3/MM3 (ref 0.1–0.9)
MONOCYTES NFR BLD AUTO: 8.7 % (ref 5–12)
NEUTROPHILS NFR BLD AUTO: 2.93 10*3/MM3 (ref 1.7–7)
NEUTROPHILS NFR BLD AUTO: 56.4 % (ref 42.7–76)
NRBC BLD AUTO-RTO: 0 /100 WBC (ref 0–0.2)
PLATELET # BLD AUTO: 193 10*3/MM3 (ref 140–450)
PMV BLD AUTO: 10.3 FL (ref 6–12)
POTASSIUM SERPL-SCNC: 4.1 MMOL/L (ref 3.5–5.2)
PROT SERPL-MCNC: 7.1 G/DL (ref 6–8.5)
RBC # BLD AUTO: 4.52 10*6/MM3 (ref 3.77–5.28)
SODIUM SERPL-SCNC: 140 MMOL/L (ref 136–145)
WBC NRBC COR # BLD: 5.19 10*3/MM3 (ref 3.4–10.8)

## 2023-06-15 PROCEDURE — 36415 COLL VENOUS BLD VENIPUNCTURE: CPT

## 2023-06-15 PROCEDURE — 80053 COMPREHEN METABOLIC PANEL: CPT

## 2023-06-15 PROCEDURE — 93005 ELECTROCARDIOGRAM TRACING: CPT

## 2023-06-15 PROCEDURE — 85025 COMPLETE CBC W/AUTO DIFF WBC: CPT

## 2023-06-15 PROCEDURE — 71045 X-RAY EXAM CHEST 1 VIEW: CPT

## 2023-06-15 NOTE — DISCHARGE INSTRUCTIONS
Before you come to the hospital        Arrival time: AS DIRECTED BY OFFICE     YOU MAY TAKE THE FOLLOWING MEDICATION(S) THE MORNING OF SURGERY WITH A SIP OF WATER: Buspar    Please Do Not Take Your Losartan/Cozaar 24 Hours Prior To Surgery           ALL OTHER HOME MEDICATION CHECK WITH YOUR PHYSICIAN (especially if   you are taking diabetes medicines or blood thinners)    Do not take any Erectile Dysfunction medications (EX: CIALIS, VIAGRA) 24 hours prior to surgery.      If you were given and instructed to use a germ- killing soap, use as directed the night before surgery and again the morning of surgery or as directed by your surgeon. (Use one-half of the bottle with each shower.)   See attached information for How to Use Chlorhexidine for Bathing if applicable.            Eating and drinking restrictions prior to scheduled arrival time    2 Hours before arrival time STOP   Drinking Clear liquids (water, black coffee-NO CREAM,  apple juice-no pulp)      8 Hours before arrival time STOP   All food, full liquids, and dairy products    (It is extremely important that you follow these guidelines to prevent delay or cancelation of your procedure)     Clear Liquids  Water and flavored water                                                                      Clear Fruit juices, such as cranberry juice and apple juice.  Black coffee (NO cream of any kind, including powdered).  Plain tea  Clear bouillon or broth.  Flavored gelatin.  Soda.  Gatorade or Powerade.  Full liquid examples  Juices that have pulp.  Frozen ice pops that contain fruit pieces.  Coffee with creamer  Milk.  Yogurt.                MANAGING PAIN AFTER SURGERY    We know you are probably wondering what your pain will be like after surgery.  Following surgery it is unrealistic to expect you will not have pain.   Pain is how our bodies let us know that something is wrong or cautions us to be careful.  That said, our goal is to make your pain  tolerable.    Methods we may use to treat your pain include (oral or IV medications, PCAs, epidurals, nerve blocks, etc.)   While some procedures require IV pain medications for a short time after surgery, transitioning to pain medications by mouth allows for better management of pain.   Your nurse will encourage you to take oral pain medications whenever possible.  IV medications work almost immediately, but only last a short while.  Taking medications by mouth allows for a more constant level of medication in your blood stream for a longer period of time.      Once your pain is out of control it is harder to get back under control.  It is important you are aware when your next dose of pain medication is due.  If you are admitted, your nurse may write the time of your next dose on the white board in your room to help you remember.      We are interested in your pain and encourage you to inform us about aggravating factors during your visit.   Many times a simple repositioning every few hours can make a big difference.    If your physician says it is okay, do not let your pain prevent you from getting out of bed. Be sure to call your nurse for assistance prior to getting up so you do not fall.      Before surgery, please decide your tolerable pain goal.  These faces help describe the pain ratings we use on a 0-10 scale.   Be prepared to tell us your goal and whether or not you take pain or anxiety medications at home.          Preparing for Surgery  Preparing for surgery is an important part of your care. It can make things go more smoothly and help you avoid complications. The steps leading up to surgery may vary among hospitals. Follow all instructions given to you by your health care providers. Ask questions if you do not understand something. Talk about any concerns that you have.  Here are some questions to consider asking before your surgery:  If my surgery is not an emergency (is elective), when would be the  best time to have the surgery?  What arrangements do I need to make for work, home, or school?  What will my recovery be like? How long will it be before I can return to normal activities?  Will I need to prepare my home? Will I need to arrange care for me or my children?  Should I expect to have pain after surgery? What are my pain management options? Are there nonmedical options that I can try for pain?  Tell a health care provider about:  Any allergies you have.  All medicines you are taking, including vitamins, herbs, eye drops, creams, and over-the-counter medicines.  Any problems you or family members have had with anesthetic medicines.  Any blood disorders you have.  Any surgeries you have had.  Any medical conditions you have.  Whether you are pregnant or may be pregnant.  What are the risks?  The risks and complications of surgery depend on the specific procedure that you have. Discuss all the risks with your health care providers before your surgery. Ask about common surgical complications, which may include:  Infection.  Bleeding or a need for blood replacement (transfusion).  Allergic reactions to medicines.  Damage to surrounding nerves, tissues, or structures.  A blood clot.  Scarring.  Failure of the surgery to correct the problem.  Follow these instructions before the procedure:  Several days or weeks before your procedure  You may have a physical exam by your primary health care provider to make sure it is safe for you to have surgery.  You may have testing. This may include a chest X-ray, blood and urine tests, electrocardiogram (ECG), or other testing.  Ask your health care provider about:  Changing or stopping your regular medicines. This is especially important if you are taking diabetes medicines or blood thinners.  Taking medicines such as aspirin and ibuprofen. These medicines can thin your blood. Do not take these medicines unless your health care provider tells you to take them.  Taking  over-the-counter medicines, vitamins, herbs, and supplements.  Do not use any products that contain nicotine or tobacco, such as cigarettes and e-cigarettes. If you need help quitting, ask your health care provider.  Avoid alcohol.  Ask your health care provider if there are exercises you can do to prepare for surgery.  Eat a healthy diet.   Plan to have someone 18 years of age or older to take you home from the hospital. We will need to verify your ride on the morning of surgery if you are being discharged home on the same day. Tell your ride to be expecting a call from the hospital prior to your procedure.   Plan to have a responsible adult care for you for at least 24 hours after you leave the hospital or clinic. This is important.  The day before your procedure  You may be given antibiotic medicine to take by mouth to help prevent infection. Take it as told by your health care provider.  You may be asked to shower with a germ-killing soap.  Follow instructions from your health care provider about eating and drinking restrictions. This includes gum, mints and hard candy.  Pack comfortable clothes according to your procedure.   The day of your procedure  You may need to take another shower with a germ-killing soap before you leave home in the morning.  With a small sip of water, take only the medicines that you are told to take.  Remove all jewelry including rings.   Leave anything you consider valuable at home except hearing aids if needed.  You do not need to bring your home medications into the hospital.   Do not wear any makeup, nail polish, powder, deodorant, lotion, hair accessories, or anything on your skin or body except your clothes.  If you will be staying in the hospital, bring a case to hold your glasses, contacts, or dentures. You may also want to bring your robe and non-skid footwear.       (Do not use denture adhesives since you will be asked to remove them during  surgery).   If you wear oxygen at  home, bring it with you the day of surgery.  If instructed by your health care provider, bring your sleep apnea device with you on the day of your surgery (if this applies to you).  You may want to leave your suitcase and sleep apnea device in the car until after surgery.   Arrive at the hospital as scheduled.  Bring a friend or family member with you who can help to answer questions and be present while you meet with your health care provider.  At the hospital  When you arrive at the hospital:  Go to registration located at the main entrance of the hospital. You will be registered and given a beeper and a sticker sheet. Take the stickers to the Outpatient nurses desk and place in the black tray. This is to notify staff that you have arrived. Then return to the lobby to wait.   When your beeper lights up and vibrates proceed through the double doors, under the stairs, and a member of the Outpatient Surgery staff will escort you to your preoperative room.  You may have to wear compression sleeves. These help to prevent blood clots and reduce swelling in your legs.  An IV may be inserted into one of your veins.              In the operating room, you may be given one or more of the following:        A medicine to help you relax (sedative).        A medicine to numb the area (local anesthetic).        A medicine to make you fall asleep (general anesthetic).        A medicine that is injected into an area of your body to numb everything below the                      injection site (regional anesthetic).  You may be given an antibiotic through your IV to help prevent infection.  Your surgical site will be marked or identified.    Contact a health care provider if you:  Develop a fever of more than 100.4°F (38°C) or other feelings of illness during the 48 hours before your surgery.  Have symptoms that get worse.  Have questions or concerns about your surgery.  Summary  Preparing for surgery can make the procedure go more  smoothly and lower your risk of complications.  Before surgery, make a list of questions and concerns to discuss with your surgeon. Ask about the risks and possible complications.  In the days or weeks before your surgery, follow all instructions from your health care provider. You may need to stop smoking, avoid alcohol, follow eating restrictions, and change or stop your regular medicines.  Contact your surgeon if you develop a fever or other signs of illness during the few days before your surgery.  This information is not intended to replace advice given to you by your health care provider. Make sure you discuss any questions you have with your health care provider.  Document Revised: 12/21/2018 Document Reviewed: 10/23/2018  [employer] Patient Education © 2021 [employer]

## 2023-06-17 LAB
QT INTERVAL: 398 MS
QTC INTERVAL: 462 MS

## 2023-06-19 ENCOUNTER — ANESTHESIA EVENT (OUTPATIENT)
Dept: PERIOP | Facility: HOSPITAL | Age: 73
End: 2023-06-19
Payer: MEDICARE

## 2023-06-19 ENCOUNTER — ANESTHESIA (OUTPATIENT)
Dept: PERIOP | Facility: HOSPITAL | Age: 73
End: 2023-06-19
Payer: MEDICARE

## 2023-06-19 PROCEDURE — 25010000002 CEFAZOLIN PER 500 MG: Performed by: STUDENT IN AN ORGANIZED HEALTH CARE EDUCATION/TRAINING PROGRAM

## 2023-06-19 PROCEDURE — 25010000002 DEXAMETHASONE PER 1 MG: Performed by: NURSE ANESTHETIST, CERTIFIED REGISTERED

## 2023-06-19 PROCEDURE — 25010000002 PROPOFOL 10 MG/ML EMULSION: Performed by: NURSE ANESTHETIST, CERTIFIED REGISTERED

## 2023-06-19 PROCEDURE — 25010000002 FENTANYL CITRATE (PF) 100 MCG/2ML SOLUTION: Performed by: NURSE ANESTHETIST, CERTIFIED REGISTERED

## 2023-06-19 PROCEDURE — 25010000002 ONDANSETRON PER 1 MG: Performed by: NURSE ANESTHETIST, CERTIFIED REGISTERED

## 2023-06-19 RX ORDER — ONDANSETRON 2 MG/ML
INJECTION INTRAMUSCULAR; INTRAVENOUS AS NEEDED
Status: DISCONTINUED | OUTPATIENT
Start: 2023-06-19 | End: 2023-06-19 | Stop reason: SURG

## 2023-06-19 RX ORDER — LIDOCAINE HYDROCHLORIDE 20 MG/ML
INJECTION, SOLUTION EPIDURAL; INFILTRATION; INTRACAUDAL; PERINEURAL AS NEEDED
Status: DISCONTINUED | OUTPATIENT
Start: 2023-06-19 | End: 2023-06-19 | Stop reason: SURG

## 2023-06-19 RX ORDER — FENTANYL CITRATE 50 UG/ML
INJECTION, SOLUTION INTRAMUSCULAR; INTRAVENOUS AS NEEDED
Status: DISCONTINUED | OUTPATIENT
Start: 2023-06-19 | End: 2023-06-19 | Stop reason: SURG

## 2023-06-19 RX ORDER — DEXAMETHASONE SODIUM PHOSPHATE 4 MG/ML
INJECTION, SOLUTION INTRA-ARTICULAR; INTRALESIONAL; INTRAMUSCULAR; INTRAVENOUS; SOFT TISSUE AS NEEDED
Status: DISCONTINUED | OUTPATIENT
Start: 2023-06-19 | End: 2023-06-19 | Stop reason: SURG

## 2023-06-19 RX ORDER — SUCCINYLCHOLINE/SOD CL,ISO/PF 200MG/10ML
SYRINGE (ML) INTRAVENOUS AS NEEDED
Status: DISCONTINUED | OUTPATIENT
Start: 2023-06-19 | End: 2023-06-19 | Stop reason: SURG

## 2023-06-19 RX ORDER — PROPOFOL 10 MG/ML
VIAL (ML) INTRAVENOUS AS NEEDED
Status: DISCONTINUED | OUTPATIENT
Start: 2023-06-19 | End: 2023-06-19 | Stop reason: SURG

## 2023-06-19 RX ADMIN — ONDANSETRON 4 MG: 2 INJECTION INTRAMUSCULAR; INTRAVENOUS at 07:10

## 2023-06-19 RX ADMIN — CEFAZOLIN 2 G: 2 INJECTION, POWDER, FOR SOLUTION INTRAMUSCULAR; INTRAVENOUS at 06:51

## 2023-06-19 RX ADMIN — Medication 80 MG: at 06:55

## 2023-06-19 RX ADMIN — PROPOFOL INJECTABLE EMULSION 150 MG: 10 INJECTION, EMULSION INTRAVENOUS at 06:55

## 2023-06-19 RX ADMIN — FENTANYL CITRATE 100 MCG: 50 INJECTION, SOLUTION INTRAMUSCULAR; INTRAVENOUS at 06:55

## 2023-06-19 RX ADMIN — DEXAMETHASONE SODIUM PHOSPHATE 4 MG: 4 INJECTION, SOLUTION INTRAMUSCULAR; INTRAVENOUS at 07:10

## 2023-06-19 RX ADMIN — LIDOCAINE HYDROCHLORIDE 100 MG: 20 INJECTION, SOLUTION EPIDURAL; INFILTRATION; INTRACAUDAL; PERINEURAL at 06:55

## 2023-06-19 NOTE — ANESTHESIA POSTPROCEDURE EVALUATION
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        Post Anesthesia Care and Evaluation    Patient location during evaluation: PACU  Patient participation: complete - patient participated  Level of consciousness: awake and alert  Pain management: adequate    Airway patency: patent  Anesthetic complications: No anesthetic complications    Cardiovascular status: acceptable  Respiratory status: acceptable  Hydration status: acceptable    Comments: Blood pressure 127/79, pulse 70, temperature 97 °F (36.1 °C), resp. rate 15, SpO2 94 %, not currently breastfeeding.    Pt discharged from PACU based on yuri score >8

## 2023-06-19 NOTE — ANESTHESIA PREPROCEDURE EVALUATION
Anesthesia Evaluation     Patient summary reviewed   no history of anesthetic complications:   NPO Solid Status: > 8 hours  NPO Liquid Status: > 8 hours           Airway   Mallampati: II  TM distance: >3 FB  Neck ROM: full  No difficulty expected  Dental - normal exam     Pulmonary    (-) asthma, sleep apnea, not a smoker  Cardiovascular   Exercise tolerance: good (4-7 METS)    ECG reviewed    (+) hypertension  (-) past MI, dysrhythmias, cardiac stents, hyperlipidemia      Neuro/Psych  (-) seizures, TIA, CVA  GI/Hepatic/Renal/Endo    (-) liver disease, no renal disease, diabetes    Musculoskeletal     Abdominal    Substance History      OB/GYN          Other      history of cancer (breast, melanoma)                    Anesthesia Plan    ASA 2     general     intravenous induction     Anesthetic plan, risks, benefits, and alternatives have been provided, discussed and informed consent has been obtained with: patient.    CODE STATUS:

## 2023-06-19 NOTE — ANESTHESIA PROCEDURE NOTES
Airway  Urgency: elective    Date/Time: 6/19/2023 6:56 AM  Airway not difficult    General Information and Staff    Patient location during procedure: OR  CRNA/CAA: AZUL Saavedra CRNA    Indications and Patient Condition  Indications for airway management: airway protection    Preoxygenated: yes  MILS maintained throughout  Mask difficulty assessment: 1 - vent by mask    Final Airway Details  Final airway type: endotracheal airway      Successful airway: ETT  Cuffed: yes   Successful intubation technique: direct laryngoscopy  Facilitating devices/methods: intubating stylet  Endotracheal tube insertion site: oral  Blade: Rivera  Blade size: 2  ETT size (mm): 7.5  Cormack-Lehane Classification: grade I - full view of glottis  Placement verified by: chest auscultation and capnometry   Cuff volume (mL): 20  Measured from: lips  ETT/EBT  to lips (cm): 20  Number of attempts at approach: 1  Assessment: lips, teeth, and gum same as pre-op and atraumatic intubation

## 2023-09-08 ENCOUNTER — OFFICE VISIT (OUTPATIENT)
Dept: FAMILY MEDICINE CLINIC | Facility: CLINIC | Age: 73
End: 2023-09-08
Payer: MEDICARE

## 2023-09-08 VITALS
HEART RATE: 84 BPM | DIASTOLIC BLOOD PRESSURE: 86 MMHG | TEMPERATURE: 97.7 F | HEIGHT: 65 IN | SYSTOLIC BLOOD PRESSURE: 124 MMHG | BODY MASS INDEX: 28.29 KG/M2 | OXYGEN SATURATION: 98 % | WEIGHT: 169.8 LBS

## 2023-09-08 DIAGNOSIS — E78.00 ELEVATED LDL CHOLESTEROL LEVEL: ICD-10-CM

## 2023-09-08 DIAGNOSIS — Z12.31 BREAST CANCER SCREENING BY MAMMOGRAM: ICD-10-CM

## 2023-09-08 DIAGNOSIS — Z00.00 MEDICARE ANNUAL WELLNESS VISIT, SUBSEQUENT: Primary | ICD-10-CM

## 2023-09-08 DIAGNOSIS — E66.3 OVERWEIGHT WITH BODY MASS INDEX (BMI) OF 28 TO 28.9 IN ADULT: ICD-10-CM

## 2023-09-08 DIAGNOSIS — I10 ESSENTIAL HYPERTENSION: ICD-10-CM

## 2023-09-08 PROCEDURE — 3079F DIAST BP 80-89 MM HG: CPT | Performed by: NURSE PRACTITIONER

## 2023-09-08 PROCEDURE — G0439 PPPS, SUBSEQ VISIT: HCPCS | Performed by: NURSE PRACTITIONER

## 2023-09-08 PROCEDURE — 1160F RVW MEDS BY RX/DR IN RCRD: CPT | Performed by: NURSE PRACTITIONER

## 2023-09-08 PROCEDURE — 1159F MED LIST DOCD IN RCRD: CPT | Performed by: NURSE PRACTITIONER

## 2023-09-08 PROCEDURE — 1170F FXNL STATUS ASSESSED: CPT | Performed by: NURSE PRACTITIONER

## 2023-09-08 PROCEDURE — 3074F SYST BP LT 130 MM HG: CPT | Performed by: NURSE PRACTITIONER

## 2023-09-08 RX ORDER — LOSARTAN POTASSIUM 25 MG/1
25 TABLET ORAL DAILY
Qty: 90 TABLET | Refills: 3 | Status: SHIPPED | OUTPATIENT
Start: 2023-09-08

## 2023-09-08 NOTE — PROGRESS NOTES
The ABCs of the Annual Wellness Visit  Subsequent Medicare Wellness Visit    Subjective      Aide Maxwell is a 72 y.o. female who presents for a Subsequent Medicare Wellness Visit.    The following portions of the patient's history were reviewed and   updated as appropriate: allergies, current medications, past family history, past medical history, past social history, past surgical history, and problem list.    Compared to one year ago, the patient feels her physical   health is the same.    Compared to one year ago, the patient feels her mental   health is the same.    Recent Hospitalizations:  She was not admitted to the hospital during the last year.       Current Medical Providers:  Patient Care Team:  Josseline Garcia APRN as PCP - General (Family Medicine)  Earnestine Verde MD as Consulting Physician (General Surgery)    Outpatient Medications Prior to Visit   Medication Sig Dispense Refill    acetaminophen (TYLENOL) 500 MG tablet Take 1 tablet by mouth Every 6 (Six) Hours As Needed for Mild Pain.      busPIRone (BUSPAR) 10 MG tablet Take 1 tablet by mouth 3 (Three) Times a Day As Needed (anxiety). 30 tablet 2    fluticasone (FLONASE) 50 MCG/ACT nasal spray 2 sprays into the nostril(s) as directed by provider As Needed for Rhinitis.      ibuprofen (ADVIL,MOTRIN) 200 MG tablet Take 1 tablet by mouth Every 6 (Six) Hours As Needed for Mild Pain.      Loratadine 10 MG capsule Take 1 capsule by mouth Daily As Needed.      naproxen sodium (ALEVE) 220 MG tablet Take 1 tablet by mouth 2 (Two) Times a Day As Needed.      losartan (Cozaar) 25 MG tablet Take 1 tablet by mouth Daily. 90 tablet 1    hydrocortisone (WESTCORT) 0.2 % cream Apply  topically to the appropriate area as directed 2 (Two) Times a Day. (Patient not taking: Reported on 9/8/2023) 60 g 2     No facility-administered medications prior to visit.       No opioid medication identified on active medication list. I have reviewed chart for other  "potential  high risk medication/s and harmful drug interactions in the elderly.        Aspirin is not on active medication list.  Aspirin use is not indicated based on review of current medical condition/s. Risk of harm outweighs potential benefits.  .    Patient Active Problem List   Diagnosis    Essential hypertension    History of thyroid nodule    Hypercalcemia    Personal history of skin cancer    Primary osteoarthritis of left knee    Vitamin D deficiency    Acute pain of left knee    Internal derangement of left knee    Degenerative tear of posterior horn of medial meniscus of left knee    S/P left knee arthroscopy    Pre-op evaluation     Advance Care Planning   Advance Care Planning     Advance Directive is not on file.  ACP discussion was declined by the patient. Patient has an advance directive (not in EMR), copy requested.     Objective    Vitals:    23 0828   BP: 124/86   BP Location: Right arm   Patient Position: Sitting   Cuff Size: Adult   Pulse: 84   Temp: 97.7 °F (36.5 °C)   SpO2: 98%   Weight: 77 kg (169 lb 12.8 oz)   Height: 165.1 cm (65\")   PainSc: 0-No pain     Estimated body mass index is 28.26 kg/m² as calculated from the following:    Height as of this encounter: 165.1 cm (65\").    Weight as of this encounter: 77 kg (169 lb 12.8 oz).           Does the patient have evidence of cognitive impairment?   No    Lab Results   Component Value Date    CHLPL 228 (H) 2023    TRIG 68 2023    HDL 74 2023     (H) 2023    VLDL 12 2023          HEALTH RISK ASSESSMENT    Smoking Status:  Social History     Tobacco Use   Smoking Status Former    Packs/day: 0.25    Years: 20.00    Pack years: 5.00    Types: Cigarettes    Quit date: 2000    Years since quittin.7   Smokeless Tobacco Never     Alcohol Consumption:  Social History     Substance and Sexual Activity   Alcohol Use Yes    Alcohol/week: 10.0 standard drinks    Types: 10 Glasses of wine per week    " Comment: wine daily     Fall Risk Screen:    [place] Fall Risk Assessment was completed, and patient is at LOW risk for falls.Assessment completed on:2023    Depression Screenin/8/2023     8:21 AM   PHQ-2/PHQ-9 Depression Screening   Little Interest or Pleasure in Doing Things 0-->not at all   Feeling Down, Depressed or Hopeless 0-->not at all   PHQ-9: Brief Depression Severity Measure Score 0       Health Habits and Functional and Cognitive Screenin/8/2023     8:26 AM   Functional & Cognitive Status   Do you have difficulty preparing food and eating? No   Do you have difficulty bathing yourself, getting dressed or grooming yourself? No   Do you have difficulty using the toilet? No   Do you have difficulty moving around from place to place? No   Do you have trouble with steps or getting out of a bed or a chair? No   Current Diet Well Balanced Diet   Dental Exam Up to date   Eye Exam Up to date   Exercise (times per week) 7 times per week   Current Exercises Include Walking;Light Weights   Do you need help using the phone?  No   Are you deaf or do you have serious difficulty hearing?  No   Do you need help to go to places out of walking distance? No   Do you need help shopping? No   Do you need help preparing meals?  No   Do you need help with housework?  No   Do you need help with laundry? No   Do you need help taking your medications? No   Do you need help managing money? No   Do you ever drive or ride in a car without wearing a seat belt? No   Have you felt unusual stress, anger or loneliness in the last month? No   Who do you live with? Spouse   If you need help, do you have trouble finding someone available to you? No   Have you been bothered in the last four weeks by sexual problems? No   Do you have difficulty concentrating, remembering or making decisions? No       Age-appropriate Screening Schedule:  Refer to the list below for future screening recommendations based on patient's age, sex  and/or medical conditions. Orders for these recommended tests are listed in the plan section. The patient has been provided with a written plan.    Health Maintenance   Topic Date Due    COVID-19 Vaccine (6 - Pfizer series) 10/18/2023 (Originally 1/19/2023)    INFLUENZA VACCINE  10/01/2023    BMI FOLLOWUP  06/12/2024    LIPID PANEL  09/05/2024    ANNUAL WELLNESS VISIT  09/08/2024    MAMMOGRAM  09/16/2024    DXA SCAN  09/16/2024    COLORECTAL CANCER SCREENING  08/09/2029    HEPATITIS C SCREENING  Completed    Pneumococcal Vaccine 65+  Completed    ZOSTER VACCINE  Completed    TDAP/TD VACCINES  Discontinued                  CMS Preventative Services Quick Reference  Risk Factors Identified During Encounter:    Immunizations Discussed/Encouraged: COVID19    The above risks/problems have been discussed with the patient.  Pertinent information has been shared with the patient in the After Visit Summary.    Diagnoses and all orders for this visit:    1. Medicare annual wellness visit, subsequent (Primary)    2. Essential hypertension  -     losartan (Cozaar) 25 MG tablet; Take 1 tablet by mouth Daily.  Dispense: 90 tablet; Refill: 3    3. Breast cancer screening by mammogram  -     Mammo Screening Digital Tomosynthesis Bilateral With CAD; Future    4. Elevated LDL cholesterol level    5. Overweight with body mass index (BMI) of 28 to 28.9 in adult    Patient encouraged to partake of healthy diet rich in fresh fruits and vegetables as well as lean proteins.  Patient encouraged to participate in daily exercise with goal of 30 min sustained activity.    Follow Up:   Next Medicare Wellness visit to be scheduled in 1 year.      An After Visit Summary and PPPS were made available to the patient.

## 2023-09-21 ENCOUNTER — HOSPITAL ENCOUNTER (OUTPATIENT)
Dept: MAMMOGRAPHY | Facility: HOSPITAL | Age: 73
Discharge: HOME | End: 2023-09-21
Admitting: NURSE PRACTITIONER
Payer: MEDICARE

## 2023-09-21 DIAGNOSIS — Z12.31 BREAST CANCER SCREENING BY MAMMOGRAM: ICD-10-CM

## 2023-09-21 PROCEDURE — 77063 BREAST TOMOSYNTHESIS BI: CPT

## 2023-09-21 PROCEDURE — 77067 SCR MAMMO BI INCL CAD: CPT

## 2024-02-28 ENCOUNTER — OFFICE VISIT (OUTPATIENT)
Dept: FAMILY MEDICINE CLINIC | Facility: CLINIC | Age: 74
End: 2024-02-28
Payer: MEDICARE

## 2024-02-28 VITALS
TEMPERATURE: 97.5 F | SYSTOLIC BLOOD PRESSURE: 126 MMHG | OXYGEN SATURATION: 98 % | HEIGHT: 65 IN | DIASTOLIC BLOOD PRESSURE: 84 MMHG | BODY MASS INDEX: 28.86 KG/M2 | WEIGHT: 173.2 LBS | HEART RATE: 102 BPM

## 2024-02-28 DIAGNOSIS — H01.112 ALLERGIC DERMATITIS OF UPPER AND LOWER LIDS OF BOTH EYES: ICD-10-CM

## 2024-02-28 DIAGNOSIS — H01.111 ALLERGIC DERMATITIS OF UPPER AND LOWER LIDS OF BOTH EYES: ICD-10-CM

## 2024-02-28 DIAGNOSIS — H01.114 ALLERGIC DERMATITIS OF UPPER AND LOWER LIDS OF BOTH EYES: ICD-10-CM

## 2024-02-28 DIAGNOSIS — L20.9 ATOPIC DERMATITIS OF FACE: Primary | ICD-10-CM

## 2024-02-28 DIAGNOSIS — L29.9 ITCHING: ICD-10-CM

## 2024-02-28 DIAGNOSIS — H01.115 ALLERGIC DERMATITIS OF UPPER AND LOWER LIDS OF BOTH EYES: ICD-10-CM

## 2024-02-28 PROCEDURE — 1160F RVW MEDS BY RX/DR IN RCRD: CPT | Performed by: NURSE PRACTITIONER

## 2024-02-28 PROCEDURE — 3079F DIAST BP 80-89 MM HG: CPT | Performed by: NURSE PRACTITIONER

## 2024-02-28 PROCEDURE — 1159F MED LIST DOCD IN RCRD: CPT | Performed by: NURSE PRACTITIONER

## 2024-02-28 PROCEDURE — 99213 OFFICE O/P EST LOW 20 MIN: CPT | Performed by: NURSE PRACTITIONER

## 2024-02-28 PROCEDURE — 3074F SYST BP LT 130 MM HG: CPT | Performed by: NURSE PRACTITIONER

## 2024-02-28 RX ORDER — DESONIDE 0.5 MG/G
1 CREAM TOPICAL 2 TIMES DAILY
COMMUNITY

## 2024-02-28 RX ORDER — HYDROXYZINE HYDROCHLORIDE 25 MG/1
25 TABLET, FILM COATED ORAL 3 TIMES DAILY PRN
Qty: 60 TABLET | Refills: 2 | Status: SHIPPED | OUTPATIENT
Start: 2024-02-28

## 2024-02-28 RX ORDER — TRIAMCINOLONE ACETONIDE 1 MG/G
1 CREAM TOPICAL 2 TIMES DAILY
COMMUNITY

## 2024-02-28 RX ORDER — PIMECROLIMUS 10 MG/G
1 CREAM TOPICAL 2 TIMES DAILY
COMMUNITY

## 2024-02-28 NOTE — PROGRESS NOTES
"Answers submitted by the patient for this visit:  Primary Reason for Visit (Submitted on 2/27/2024)  What is the primary reason for your visit?: Rash  Rash Questionnaire (Submitted on 2/27/2024)  Chief Complaint: Rash  Chronicity: chronic  Onset: more than 1 year ago  Progression since onset: worsening  Affected locations: scalp, head, face, lips, neck, chest  Characteristics: burning, dryness, redness, itchiness, scaling  Exposed to: nothing  anorexia: No  congestion: No  cough: No  diarrhea: No  facial edema: No  fatigue: No  fever: No  joint pain: No  nail changes: No  shortness of breath: No  sore throat: No  vomiting: No  Additional Information: effecting eyes  Chief Complaint  Facial Swelling (Around eyes, red, swelling )    Subjective        Aide Maxwell presents to [facility]  History of Present Illness  She was treated with dupixent for eczema bu dermatology at [facility] in [place].  Her symptoms have significantly worsened since she started it. She stopped it in December but symptoms continued to worsen on her face and neck. Patient was in [place] for the past couple of months. She was seen at an urgent care and given IM steroid and oral steroid as well as a steroid eye ointment.  She did have improvement; however, it was modest and as soon as she stopped the steroids, it began to worsen again. She has a follow up with [facility] next month.  For now, she would like any guidance to provide some relief.  Her main complaint is any time she is outside her eyes water excessively. The more she has to blot or wipe, the more intense the itching becomes.    Objective   Vital Signs:  /84 (BP Location: Left arm, Patient Position: Sitting, Cuff Size: Large Adult)   Pulse 102   Temp 97.5 °F (36.4 °C)   Ht 165.1 cm (65\")   Wt 78.6 kg (173 lb 3.2 oz)   SpO2 98%   BMI 28.82 kg/m²   Estimated body mass index is 28.82 kg/m² as calculated from the following:    " "Height as of this encounter: 165.1 cm (65\").    Weight as of this encounter: 78.6 kg (173 lb 3.2 oz).             Physical Exam  Vitals and nursing note reviewed.   Constitutional:       General: She is not in acute distress.     Appearance: Normal appearance. She is not ill-appearing.   HENT:      Head: Normocephalic and atraumatic.   Eyes:      Comments: Slight edema of upper and lower lids with mild erythema. Conjunctiva are slightly irritated without appearance of infection.   Pulmonary:      Effort: Pulmonary effort is normal.      Breath sounds: Normal breath sounds.   Skin:     General: Skin is warm and dry.      Findings: Erythema and rash present.      Comments: Dry scaly patches with erythema to the hairline of the face, patches on both cheeks and across the chin. The glabella is scaly and pink with mild edema bilaterally.   Neurological:      Mental Status: She is alert and oriented to person, place, and time.        Result Review :                Assessment and Plan   Diagnoses and all orders for this visit:    1. Atopic dermatitis of face (Primary)    2. Allergic dermatitis of upper and lower lids of both eyes  -     Loteprednol Etabonate 0.25 % suspension; Apply 1 drop to eye(s) as directed by provider 4 (Four) Times a Day.  Dispense: 8.3 mL; Refill: 0    3. Itching  -     hydrOXYzine (ATARAX) 25 MG tablet; Take 1 tablet by mouth 3 (Three) Times a Day As Needed for Itching.  Dispense: 60 tablet; Refill: 2    Patient will follow with dermatology as scheduled. She is encouraged to call her eye doc for visit and his recommendations regarding her eye symptoms. She voiced understanding.         Follow Up   Return if symptoms worsen or fail to improve.  Patient was given instructions and counseling regarding her condition or for health maintenance advice. Please see specific information pulled into the AVS if appropriate.     ALEXIS Hunter  This note is electronically signed.  "

## 2024-04-29 ENCOUNTER — OFFICE VISIT (OUTPATIENT)
Dept: FAMILY MEDICINE CLINIC | Facility: CLINIC | Age: 74
End: 2024-04-29
Payer: MEDICARE

## 2024-04-29 VITALS
WEIGHT: 172 LBS | OXYGEN SATURATION: 98 % | DIASTOLIC BLOOD PRESSURE: 89 MMHG | BODY MASS INDEX: 28.66 KG/M2 | SYSTOLIC BLOOD PRESSURE: 136 MMHG | HEIGHT: 65 IN | HEART RATE: 88 BPM | TEMPERATURE: 98.6 F

## 2024-04-29 DIAGNOSIS — J35.1 SWOLLEN TONSIL: Primary | ICD-10-CM

## 2024-04-29 PROCEDURE — 99213 OFFICE O/P EST LOW 20 MIN: CPT | Performed by: NURSE PRACTITIONER

## 2024-04-29 PROCEDURE — 3079F DIAST BP 80-89 MM HG: CPT | Performed by: NURSE PRACTITIONER

## 2024-04-29 PROCEDURE — 1160F RVW MEDS BY RX/DR IN RCRD: CPT | Performed by: NURSE PRACTITIONER

## 2024-04-29 PROCEDURE — 3075F SYST BP GE 130 - 139MM HG: CPT | Performed by: NURSE PRACTITIONER

## 2024-04-29 PROCEDURE — 1159F MED LIST DOCD IN RCRD: CPT | Performed by: NURSE PRACTITIONER

## 2024-04-29 RX ORDER — CEPHALEXIN 500 MG/1
500 CAPSULE ORAL 2 TIMES DAILY
Qty: 14 CAPSULE | Refills: 0 | Status: SHIPPED | OUTPATIENT
Start: 2024-04-29

## 2024-04-29 RX ORDER — MELOXICAM 15 MG/1
15 TABLET ORAL DAILY
COMMUNITY
Start: 2024-03-26 | End: 2024-06-25

## 2024-04-29 NOTE — PROGRESS NOTES
"Answers submitted by the patient for this visit:  Other (Submitted on 4/26/2024)  Please describe your symptoms.: Swollen lymph nodes  Have you had these symptoms before?: No  How long have you been having these symptoms?: Greater than 2 weeks  Primary Reason for Visit (Submitted on 4/26/2024)  What is the primary reason for your visit?: Other  Chief Complaint   Patient presents with    Sore Throat     Swelling, right side, x1 month         Subjective   Aide Maxwell is a 73 y.o. female who presents today for knot in throat.    HPI   She states she has had a knot in the right side of her throat in the area of her right tonsil that comes and goes x 3 months. It is better in the morning and worse as the day goes on. It does not inhibit her from eating or drinking. She does not feel ill.     No Known Allergies      OBJECTIVE:  Vitals:    04/29/24 0738   BP: 136/89   BP Location: Right arm   Patient Position: Sitting   Cuff Size: Adult   Pulse: 88   Temp: 98.6 °F (37 °C)   SpO2: 98%   Weight: 78 kg (172 lb)   Height: 165.1 cm (65\")     Physical Exam  HENT:      Mouth/Throat:      Mouth: Mucous membranes are moist.      Comments: Tonsils equal in size without exudate or edema.                    ASSESSMENT/ PLAN:    Diagnoses and all orders for this visit:    1. Swollen tonsil (Primary)  -     cephalexin (Keflex) 500 MG capsule; Take 1 capsule by mouth 2 (Two) Times a Day.  Dispense: 14 capsule; Refill: 0      Procedures     Management Plan:   We will try a short course of antibiotics to see if this sensation will go away. She states she does not feel the swelling today.   An After Visit Summary was printed and given to the patient at discharge.    Follow-up: Return if symptoms worsen or fail to improve.         Asher Flores, ALEXIS 4/29/2024 08:39 CDT  This note was electronically signed.          "

## 2024-06-21 ENCOUNTER — OFFICE VISIT (OUTPATIENT)
Dept: FAMILY MEDICINE CLINIC | Facility: CLINIC | Age: 74
End: 2024-06-21
Payer: MEDICARE

## 2024-06-21 VITALS
HEART RATE: 84 BPM | HEIGHT: 65 IN | TEMPERATURE: 97 F | OXYGEN SATURATION: 98 % | DIASTOLIC BLOOD PRESSURE: 75 MMHG | BODY MASS INDEX: 28.16 KG/M2 | WEIGHT: 169 LBS | SYSTOLIC BLOOD PRESSURE: 119 MMHG | RESPIRATION RATE: 18 BRPM

## 2024-06-21 DIAGNOSIS — R09.89 THROAT FULLNESS: Primary | ICD-10-CM

## 2024-06-21 DIAGNOSIS — Z87.891 HISTORY OF TOBACCO USE: ICD-10-CM

## 2024-06-21 PROCEDURE — 99213 OFFICE O/P EST LOW 20 MIN: CPT | Performed by: NURSE PRACTITIONER

## 2024-06-21 PROCEDURE — 1126F AMNT PAIN NOTED NONE PRSNT: CPT | Performed by: NURSE PRACTITIONER

## 2024-06-21 PROCEDURE — 3078F DIAST BP <80 MM HG: CPT | Performed by: NURSE PRACTITIONER

## 2024-06-21 PROCEDURE — 1159F MED LIST DOCD IN RCRD: CPT | Performed by: NURSE PRACTITIONER

## 2024-06-21 PROCEDURE — 1160F RVW MEDS BY RX/DR IN RCRD: CPT | Performed by: NURSE PRACTITIONER

## 2024-06-21 PROCEDURE — 3074F SYST BP LT 130 MM HG: CPT | Performed by: NURSE PRACTITIONER

## 2024-06-21 NOTE — PROGRESS NOTES
"Answers submitted by the patient for this visit:  Primary Reason for Visit (Submitted on 6/20/2024)  What is the primary reason for your visit?: Sore Throat  Sore Throat Questionnaire (Submitted on 6/20/2024)  Chief Complaint: Sore throat  drainage: No  Chief Complaint  Sore Throat (Saw Asher 4/29/24 took antibiotic without change and continues)    Subjective        Aide Maxwell presents to [facility]  History of Present Illness  Patient feels like there is something in her throat on the right side. This has been ongoing since April. She was prescribed keflex in May and finished the course but notes no relief.     Objective   Vital Signs:  /75 (BP Location: Left arm, Patient Position: Sitting, Cuff Size: Adult)   Pulse 84   Temp 97 °F (36.1 °C) (Temporal)   Resp 18   Ht 165.1 cm (65\")   Wt 76.7 kg (169 lb)   SpO2 98%   BMI 28.12 kg/m²   Estimated body mass index is 28.12 kg/m² as calculated from the following:    Height as of this encounter: 165.1 cm (65\").    Weight as of this encounter: 76.7 kg (169 lb).    BMI is >= 25 and <30. (Overweight) The following options were offered after discussion;: exercise counseling/recommendations and nutrition counseling/recommendations        Physical Exam  Vitals and nursing note reviewed.   Constitutional:       General: She is not in acute distress.     Appearance: Normal appearance. She is not ill-appearing.   HENT:      Head: Normocephalic and atraumatic.      Right Ear: Ear canal normal.      Left Ear: Tympanic membrane and ear canal normal.      Ears:      Comments: Mild-moderate serous effusion on the right.     Mouth/Throat:      Mouth: Mucous membranes are moist.      Pharynx: Oropharynx is clear.      Comments: Normal oropharyngeal examination.  Cardiovascular:      Rate and Rhythm: Normal rate and regular rhythm.      Heart sounds: Normal heart sounds. No murmur heard.  Pulmonary:      Effort: Pulmonary effort is " normal.      Breath sounds: Normal breath sounds.   Musculoskeletal:      Cervical back: No tenderness.   Lymphadenopathy:      Cervical: No cervical adenopathy.   Skin:     General: Skin is warm and dry.   Neurological:      Mental Status: She is alert and oriented to person, place, and time.        Result Review :                Assessment and Plan   Diagnoses and all orders for this visit:    1. Throat fullness (Primary)  -     Ambulatory Referral to ENT (Otolaryngology)    2. History of tobacco use  -     Ambulatory Referral to ENT (Otolaryngology)             Follow Up   Return if symptoms worsen or fail to improve (keep scheduled appt).  Patient was given instructions and counseling regarding her condition or for health maintenance advice. Please see specific information pulled into the AVS if appropriate.     ALEXIS Hunter  This note is electronically signed.

## 2024-07-16 ENCOUNTER — OFFICE VISIT (OUTPATIENT)
Dept: OTOLARYNGOLOGY | Facility: CLINIC | Age: 74
End: 2024-07-16
Payer: MEDICARE

## 2024-07-16 VITALS
WEIGHT: 171.2 LBS | DIASTOLIC BLOOD PRESSURE: 110 MMHG | BODY MASS INDEX: 28.52 KG/M2 | SYSTOLIC BLOOD PRESSURE: 167 MMHG | HEART RATE: 97 BPM | RESPIRATION RATE: 19 BRPM | TEMPERATURE: 97.3 F | HEIGHT: 65 IN

## 2024-07-16 DIAGNOSIS — Z87.891 FORMER SMOKER: ICD-10-CM

## 2024-07-16 DIAGNOSIS — Z87.891 HISTORY OF TOBACCO USE: ICD-10-CM

## 2024-07-16 DIAGNOSIS — R09.A2 GLOBUS SENSATION: Primary | ICD-10-CM

## 2024-07-16 RX ORDER — FLUTICASONE PROPIONATE 50 MCG
2 SPRAY, SUSPENSION (ML) NASAL DAILY
Qty: 16 G | Refills: 3 | Status: SHIPPED | OUTPATIENT
Start: 2024-07-16

## 2024-07-16 RX ORDER — OMEPRAZOLE 40 MG/1
40 CAPSULE, DELAYED RELEASE ORAL DAILY
Qty: 60 CAPSULE | Refills: 3 | Status: SHIPPED | OUTPATIENT
Start: 2024-07-16

## 2024-07-16 NOTE — PATIENT INSTRUCTIONS
">NASAL STEROID use:  Using nasal steroids:  You will be prescribed one of the following nasal steroids: Flonase, Nasacort, Nasonex, Rhinocort, Qnasl, Zetonna  2 puffs each nostril 2 times daily  Start as soon as possible  If you are using Afrin for 3 days with the nasal steroid,  Use Afrin first and wait 10 minutes to allow the nose to open. Then administer nasal steroids.   >NASAL SALINE:  Use 2 puffs each nostril 4-6 times daily and more frequently if possible.  You can buy saline spray or you can make your own and use an old spray bottle to administer  Use a humidifier at bedside  Recipe for saline:  Water                                 1 quart  Salt (table)                        1 tablespoon  Gylcerin (or Usha Syrup)    1 teaspoon  Sodium bicarbonate           1 teaspoon  Sprays or Yarelis pots are recommended    Do not allow to stand for more than 24 hrs. Make new solution. There is no preservative in this solution.    Sinus irrigation and saline application can be enhanced with various over-the-counter products.  A WaterPik can be quite useful to irrigate, especially following sinus surgery.  Navage makes a product that irrigates the nose and some of the sinuses.  NeilMed makes a Sinu-Gator to irrigate the sinuses.  Neomed also has canned saline that we will come out under pressure.  A Dickens pot can also be helpful.  All of these products help keep the nose clear of debris.  Please use as directed on the instructions that come with the particular device.     THE PROBLEM OF ACID REFLUX    In BOTH children and adults, irritating acids may leak from the stomach and come up into the esophagus and throat. This can occur at any time, but occurs more commonly when lying down. When the acid touches the lining of the esophagus, there is irritation and muscle spasm, which can be felt up into the throat. Usually this is felt as \"heartburn\". When scarring of the esophagus occurs, the esophagus becomes less sensitive and " the symptoms may only be in the throat.     Some of the symptoms that can occur with acid reflux into the throat include coughing, soreness, burning, hoarseness, throat clearing, excessive mucous, bad taste in the mouth, bad breath, a sensation of a lump in the throat, wheezing, post-nasal drip, choking spells, bleeding and nausea. In a small percentage of people, more serious problems may result, such as pneumonia, ulcers in the larynx (voice box), reduced mobility of the vocal cords and a pouch in the upper esophagus (diverticulum). In children, the symptoms may be different and include persistent vomiting, bleeding from the esophagus, respiratory problems, pneumonia, asthma, choking spells, swallowing problems and anemia. In some cases, unexplained fussiness and crying is due to reflux.     The following instructions are designed to help neutralize the stomach acid, reduce the production of the acid, promote emptying of the acid from the stomach into the intestines and prevent acid from coming up into the esophagus. You should adopt enough of these changes to alleviate your symptoms. If carrying out these suggestions produces no change, it is imperative that you return so that we can discuss further the problem. More testing may be required, as might medication. It is important to realize that the esophagus takes time to heal, so you should not expect results immediately.    Diet  Most often, the throat symptoms above are due to dietary abuses. Certain foods are known to cause irritation, because they are acids themselves or cause excess production of stomach acid. These should be avoided, if possible. The following is a partial list of foods recognized as troublesome: coffee (even decaffeinated), tea chocolate, cola beverages, citrus beverages (such as 7-Up), caffeine containing beverages, alcohol, citrus fruits and juices, highly spiced foods, fatty foods, candies, nuts, mints, milk and milk products. Some of  the worst offenders for promoting stomach acid are milk and beer.     Hard candies, gum, breath fresheners, throat lozenges, cough drops, mouthwashes, gargles, may actually irritate the throat directly (many cough drops and lozenges contain irritants such as oil of eucalyptus and menthol), and can also stimulate acid production directly.     Large amounts of liquid in the diet tend to promote reflux, because liquids tend to come back up more easily than solids.     Meals should be bland and consist of real food, trying to avoid recreational food. Multiple small meals, rather than one or two large meals helps prevent reflux by not stretching the stomach and increasing the time needed for digestion, as well increasing the amount of acid needed to digest the meal. If you are overweight, losing weight is tremendously helpful.     YOU SHOULD NOT EAT FOR AT LEAST TWO HOURS BEFORE RETIRING AND YOU SHOULD REMAIN SITTING OR STANDING FOR AT LEAST 45 MINUTES AFTER EACH MEAL. This promotes passage of food from the stomach into the intestine.    Posture  Body weight is a significant factor in promoting reflux. Losing weight is beneficial. Pregnancy will markedly increase the symptoms of heartburn and throat pain. You should avoid clothing that fits tightly across the mid-section, as this promotes squeezing of the abdominal contents upward. It is helpful to practice abdominal or diaphragmatic breathing, using the stomach to push out each breath rather than chest expansion. Avoid slumping while sitting, and avoid stooping or straining.     For many, the reflux occurs at night while sleeping. This is the time acid production is the greatest and you are lying down, a position that promotes reflux, thus setting up the irritation that occurs the next morning. One of the most important things you can do is to elevate the head of the bed, in an effort to use gravity to keep the acid in the stomach. This is accomplished by placed blocks  or bricks beneath the legs at the head of the bed, raising the head 6-10 inches. Do not make the head so high that you slide down. Pillows are not effective because they cause the body to curl, increasing abdominal pressure and it is difficult to remain upright on them. Additionally, pillows promote sleeping on the back, but it is far more desirable to sleep on the right side or on the stomach, as this allows gas to escape, while preventing the escape of acid material. Children and infants, who are refluxing, should sleep on their stomachs.  Exercise  Regular exercise is extremely beneficial in promoting good digestion and regularity. The abdominal muscles are toned, which aids in controlling acid problems. However, it is recommended that you not participate in vigorous activity immediately after eating. This causes pressure on an already full stomach, forcing acid up into the esophagus. Regular exercise is encouraged, prior to meals, or two hours after meals.  Antacids  Antacids should be used regularly, as they neutralize excess acid. Gelusil II, Mylanta II, Tums and Rolaids are popular brands. Gaviscon is not an antacid, but floats on top of the stomach acid, preventing esophageal irritation. Care should be used in administering large doses of antacids, especially in children. Many antacid preparations contain magnesium, which promotes frequent bowel movements, while antacids that contain aluminum can be constipating. Tums have a high calcium content that can be used to some advantage in older women with reflux.     Antacid tablets are convenient, but the liquid form tends to work much more quickly. Also remember to check with your physician about interference with other medications. Antacids can slow the absorption of digitalis, Indocin, tetracyclines, fluorides and isoniazid from the intestines. Many medications require the presence of stomach acid to be absorbed.     Initially, antacids should be used 30  minutes after each meal, 2 hours after each meal and at bedtime. This maximizes the neutralization of the stomach acid. Antacids can be used more frequently if symptoms persist, but such extensive use needs to be reviewed with your physician.  Prescription Medications  If the above recommendations are not effectively resolving the symptoms, medications may be prescribed. These are usually in the form of acid production blockers, such as Tagamet, Zantac, Pepcid, or Axid or acid pump inhibitors like Prevacid, Nexium, Protonix or Prilosec. These drugs help stop acid production in the stomach. Medications such as Reglan can be used to promote stomach emptying.  Medications That Promote Reflux  Certain medications can promote acid reflux; either by relaxing the sphincter muscle that helps keeps stomach acid down, or increasing the acid production. These include progesterone (Provera, Ortho Novum, Ovral, other birth control pills), theophylline (Gregory-Dur, etc.), anticholinergic (Donnatal, Scopolamine, Probanthine, Bentil, others), beta blockers (Inderal, Tenormin and Corgard), alpha blockers (Dibenzyline), dopamine, calcium channel blockers (Procardia, Cardizem, Isotin, Calan), aspirin, non-steroidal anti-inflammatory drugs (Motrin, Advil, Nuprin, Nalfon, Rufen, Indocin, Feldene, Clinoril and Tolectin). Vitamin C is an acid and can promote reflux. This is only a partial list and before stopping any prescription medication, you should check with your physician.    Goal  The goal is to reduce the symptoms with the least number of lifestyle changes. A combination of the above suggestions is frequently prescribed to return you to normal as quickly as possible. However, this problem did not develop overnight and will not disappear rapidly. Therefore, developing a regimen will aid in controlling symptoms and keep you symptom free for a long period of time. Other alternatives exist, should these suggestions fail, and can be  discussed with your physician.     To Summarize:  Watch your diet, avoid foods that cause acid reflux  Lose weight  Avoid tight fitting clothes  Adjust your posture, raise the head of the bed  Exercise regularly  Use antacids  Use prescription medication as directed  Avoid medications that promote reflux  Avoid behavior and eating habits that promote reflux of stomach acid     You are welcome to do a Google search on the topic of reflux and reflux diets. The internet has many useful resources.     Please remember, your success in controlling this condition depends on many factors including diet, exercise, medications and follow up. All are important and must be utilized to achieve success.      REFLUX MEDICATION USE:   Omeprazole/Prilosec, Zegerid      ANTACID USE:  yes  Use antacids 30 mins after every meal, 2 hours after every meal and at Bedtime  Use Gaviscon Extra (best), Tums, Rolaids, etc  Over the counter  Use 2 tsp or 2 tabs as the dose  Remember that some of these products contain Calcium or Aluminum. If you have dietary or medical issues, please inform physician

## 2024-08-28 ENCOUNTER — OFFICE VISIT (OUTPATIENT)
Dept: OTOLARYNGOLOGY | Facility: CLINIC | Age: 74
End: 2024-08-28
Payer: MEDICARE

## 2024-08-28 VITALS
SYSTOLIC BLOOD PRESSURE: 149 MMHG | HEIGHT: 65 IN | BODY MASS INDEX: 28.36 KG/M2 | TEMPERATURE: 98.6 F | WEIGHT: 170.2 LBS | HEART RATE: 86 BPM | DIASTOLIC BLOOD PRESSURE: 87 MMHG

## 2024-08-28 DIAGNOSIS — Z87.891 FORMER SMOKER: ICD-10-CM

## 2024-08-28 DIAGNOSIS — L72.0 INCLUSION CYST: ICD-10-CM

## 2024-08-28 DIAGNOSIS — R09.A2 GLOBUS SENSATION: Primary | ICD-10-CM

## 2024-08-28 DIAGNOSIS — Z87.891 HISTORY OF TOBACCO USE: ICD-10-CM

## 2024-08-28 RX ORDER — OMEPRAZOLE 40 MG/1
40 CAPSULE, DELAYED RELEASE ORAL DAILY
Qty: 60 CAPSULE | Refills: 3 | Status: SHIPPED | OUTPATIENT
Start: 2024-08-28

## 2024-08-28 NOTE — PROGRESS NOTES
ALEXIS Ceron  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 Symptoms have been present for months. Admits globus sensation. Denies dysphagia, dysphonia, sore throat, or strep infection.     Since last visit she reports she has been taking Prilosec and flonase, using antacids some, not really changed diet but did start walking in the last week. She denies notable change in symptoms. No improvement or worsening. Still just describes globus sensation, only really notices when she swallows but again denies difficulty swallowing or choking. Stats she feels a lump or knot to the mid right neck.     Past Medical History:   Diagnosis Date    Allergic     Breast cancer 2000    right    Eczema     History of transfusion     Hx of radiation therapy 2000    Hypertension        Past Surgical History:   Procedure Laterality Date    BREAST BIOPSY Right 2000    cancer    BREAST LUMPECTOMY Right 2000    COLONOSCOPY      KNEE ARTHROSCOPY Left 03/03/2021    Procedure: arthroscopy of left knee with debridement of medial and lateral meniscus;  Surgeon: Jamil Lee MD;  Location: [facility] OR;  Service: Orthopedics;  Laterality: Left;    KNEE SURGERY Left     PARATHYROIDECTOMY      THYROIDECTOMY, PARTIAL Right     TUBAL ABDOMINAL LIGATION      WIDE EXCISION LESION/MASS TRUNK Right 6/19/2023    Procedure: 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     Social History: She  reports that she quit smoking about 24 years ago. Her smoking use included cigarettes. She started smoking about 44 years ago. She has a 5 pack-year smoking history. She has been exposed to tobacco smoke. She has never used smokeless tobacco. She reports current alcohol use of about 10.0 standard drinks of alcohol per week. She reports that she does not use drugs.    Home Medications:  desonide, fluticasone, losartan, omeprazole, pimecrolimus, and triamcinolone    Allergies:  She has No Known Allergies.       Vital Signs:   Temp:  [98.6 °F (37 °C)] 98.6 °F (37 °C)  Heart Rate:  [86] 86  BP: (149)/(87) 149/87  ENT Physical Exam  Constitutional  Appearance: patient appears well-developed, well-nourished and well-groomed,  Communication/Voice: communication appropriate for developmental age; vocal quality normal;  Head and Face  Appearance: head appears normal, face appears normal and face appears atraumatic;  Palpation: facial palpation normal;  Ear  Hearing: intact;  Auricles: bilateral auricles normal;  External Mastoids: bilateral external mastoids normal;  Ear Canals: bilateral ear canals normal;  Tympanic Membranes: bilateral tympanic membranes normal;  Nose  External Nose: nares patent bilaterally; external nose normal;  Internal Nose: bilateral intranasal mucosa erythematous; bilateral inferior turbinates normal;  Oral Cavity/Oropharynx  Lips: normal;  Teeth: normal;  Gums: gingiva normal;  Tongue: normal;  Oral mucosa: normal;  Hard palate: normal;  Soft palate: normal;  Tonsils: normal; Tonsil comments: Inclusion cyst right tonsil  Base of Tongue: normal;  Posterior pharyngeal wall: appearance is erythematous and inflamed;  Neck  Neck: neck normal; neck palpation normal;  Respiratory  Inspection: breathing unlabored; normal breathing  rate;  Cardiovascular  Inspection: extremities are warm and well perfused;  Lymphatic  Palpation: lymph nodes normal;  Neurovestibular  Mental Status: alert and oriented;       Laryngoscopy    Date/Time: 8/28/2024 9:44 AM    Performed by: Ananth Burnette APRN  Authorized by: Ananth Burnette APRN    Consent:     Consent obtained:  Verbal    Consent given by:  Patient    Risks discussed:  Bleeding, infection, nerve damage and pain    Alternatives discussed:  No treatment, alternative treatment, observation and delayed treatment  Universal protocol:     Procedure explained and questions answered to patient or proxy's satisfaction: yes      Immediately prior to procedure, a time out was called: yes      Patient identity confirmed:  Verbally with patient  Anesthesia (see MAR for exact dosages):     Anesthesia method:  Topical application  Procedure details:     Indications: evaluation of larynx      Indications comment:  Globus sensation    Medication:  Afrin    Scope location: left nare    Oropharynx/ Supraglottis:     Posterior pharyngeal wall: inflamed      Oropharynx: nonobstructing and nonobstructing       comment:  Inclusion cyst to right inferior tonsil    Base of tongue: lingual tonsillar hypertrophy      Epiglottis: normal    Larynx/ Hypopharynx:     Arytenoids: interarytenoid edema (mild)      Hypopharynx: normal      Pyriform sinus: normal      False vocal cords: normal      True vocal cords: no immobility, no reduced mobility, no lesion, no vocal cord atrophy and no nodules    Post-procedure details:     Patient tolerance of procedure:  Tolerated well     Result Review       RESULTS REVIEW    I have reviewed the patients old records in the chart.               Assessment & Plan  Globus sensation    History of tobacco use    Former smoker    Inclusion cyst       Orders Placed This Encounter   Procedures    $ Laryngoscopy      New Medications Ordered This Visit   Medications    omeprazole (priLOSEC) 40 MG  capsule     Sig: Take 1 capsule by mouth Daily. Take 30 minutes to 1 hour before a meal     Dispense:  60 capsule     Refill:  3       Laryngoscopy performed as described above.  Patient has very mild inflammation in the pharynx and larynx.  She has some sublingual tonsillar hypertrophy causing some of her globus sensation but otherwise exam is relatively normal there is a notable inclusion cyst to the right inferior tonsillar area, exam performed with Dr. Hirsch and treatment options discussed.  At this time he does not have high suspicion this is causing her globus sensation but it is in the area that she feels sensation at, we discussed potential removal of this.  Patient is interested in removal. Will bring back with Dr. Hirsch for possible removal.     Continue reflux precautions  Diet and exercise  Continue prilosec  Continue flonase    Call with questions or concerns    Return for Follow up with Dr. Hirsch.        Electronically signed by ALEXIS Ceron 08/28/24 11:10 AM CDT.

## 2024-09-09 ENCOUNTER — OFFICE VISIT (OUTPATIENT)
Dept: FAMILY MEDICINE CLINIC | Facility: CLINIC | Age: 74
End: 2024-09-09
Payer: MEDICARE

## 2024-09-09 VITALS
DIASTOLIC BLOOD PRESSURE: 86 MMHG | TEMPERATURE: 97.8 F | OXYGEN SATURATION: 100 % | WEIGHT: 169 LBS | HEIGHT: 65 IN | HEART RATE: 85 BPM | BODY MASS INDEX: 28.16 KG/M2 | SYSTOLIC BLOOD PRESSURE: 134 MMHG

## 2024-09-09 DIAGNOSIS — H10.413 CHRONIC GIANT PAPILLARY CONJUNCTIVITIS OF BOTH EYES: ICD-10-CM

## 2024-09-09 DIAGNOSIS — E66.3 OVERWEIGHT WITH BODY MASS INDEX (BMI) OF 28 TO 28.9 IN ADULT: ICD-10-CM

## 2024-09-09 DIAGNOSIS — Z12.31 BREAST CANCER SCREENING BY MAMMOGRAM: ICD-10-CM

## 2024-09-09 DIAGNOSIS — E78.00 ELEVATED LDL CHOLESTEROL LEVEL: ICD-10-CM

## 2024-09-09 DIAGNOSIS — E78.1 HYPERTRIGLYCERIDEMIA: ICD-10-CM

## 2024-09-09 DIAGNOSIS — Z00.00 MEDICARE ANNUAL WELLNESS VISIT, SUBSEQUENT: Primary | ICD-10-CM

## 2024-09-09 DIAGNOSIS — M17.12 PRIMARY OSTEOARTHRITIS OF LEFT KNEE: ICD-10-CM

## 2024-09-09 DIAGNOSIS — Z78.0 POST-MENOPAUSAL: ICD-10-CM

## 2024-09-09 DIAGNOSIS — I10 ESSENTIAL HYPERTENSION: ICD-10-CM

## 2024-09-09 PROCEDURE — 1126F AMNT PAIN NOTED NONE PRSNT: CPT | Performed by: NURSE PRACTITIONER

## 2024-09-09 PROCEDURE — 3075F SYST BP GE 130 - 139MM HG: CPT | Performed by: NURSE PRACTITIONER

## 2024-09-09 PROCEDURE — 1170F FXNL STATUS ASSESSED: CPT | Performed by: NURSE PRACTITIONER

## 2024-09-09 PROCEDURE — G0439 PPPS, SUBSEQ VISIT: HCPCS | Performed by: NURSE PRACTITIONER

## 2024-09-09 PROCEDURE — 1160F RVW MEDS BY RX/DR IN RCRD: CPT | Performed by: NURSE PRACTITIONER

## 2024-09-09 PROCEDURE — 3079F DIAST BP 80-89 MM HG: CPT | Performed by: NURSE PRACTITIONER

## 2024-09-09 PROCEDURE — 1159F MED LIST DOCD IN RCRD: CPT | Performed by: NURSE PRACTITIONER

## 2024-09-09 RX ORDER — OLOPATADINE HYDROCHLORIDE 2 MG/ML
1 SOLUTION/ DROPS OPHTHALMIC DAILY
Qty: 7.5 ML | Refills: 3 | Status: SHIPPED | OUTPATIENT
Start: 2024-09-09

## 2024-09-09 RX ORDER — MELOXICAM 15 MG/1
15 TABLET ORAL DAILY
COMMUNITY
End: 2024-09-09 | Stop reason: SDUPTHER

## 2024-09-09 RX ORDER — MELOXICAM 15 MG/1
15 TABLET ORAL DAILY
Qty: 90 TABLET | Refills: 3 | Status: SHIPPED | OUTPATIENT
Start: 2024-09-09

## 2024-09-09 RX ORDER — LOSARTAN POTASSIUM AND HYDROCHLOROTHIAZIDE 12.5; 5 MG/1; MG/1
1 TABLET ORAL DAILY
Qty: 90 TABLET | Refills: 3 | Status: SHIPPED | OUTPATIENT
Start: 2024-09-09

## 2024-09-09 NOTE — PROGRESS NOTES
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    The following portions of the patient's history were reviewed and   updated as appropriate: allergies, current medications, past family history, past medical history, past social history, past surgical history, and problem list.    Compared to one year ago, the patient's physical   health is the same.  Compared to one year ago, the patient's mental   health is the same.    Recent Hospitalizations:  She was not admitted to the hospital during the last year.     Current Medical Providers:  Patient Care Team:  Josseline Garcia APRN as PCP - General (Family Medicine)  Earnestine Verde MD as Consulting Physician (General Surgery)  Fab Hirsch Jr., MD as Consulting Physician (Otolaryngology)  Ananth Burnette APRN as Nurse Practitioner (Nurse Practitioner)  Lesley Kendall PA as Physician Assistant (Dermatology)  Warner Carter OD (Optometry)  Liang Cummings MD (Dermatopathology)  Venkat Panchal PA-C (Physician Assistant)  Jamil Lee MD as Surgeon (Orthopedic Surgery)  Justin Hills MD as Consulting Physician (Dermatology)  Sharmin Templeton PA as Physician Assistant (Physician Assistant)  Terell Tapia MD as Consulting Physician (General Practice)    Outpatient Medications Prior to Visit   Medication Sig Dispense Refill    desonide (DESOWEN) 0.05 % cream Apply 1 Application topically to the appropriate area as directed 2 (Two) Times a Day.      fluticasone (FLONASE) 50 MCG/ACT nasal spray Instill 2 sprays in each nostril as directed by provider Daily. 16 g 3    omeprazole (priLOSEC) 40 MG capsule Take 1 capsule by mouth Daily. Take 30 minutes to 1 hour before a meal 60 capsule 3    pimecrolimus (ELIDEL) 1 % cream Apply 1 Application topically to the appropriate area as directed 2 (Two) Times a Day.    "   triamcinolone (KENALOG) 0.1 % cream Apply 1 Application topically to the appropriate area as directed 2 (Two) Times a Day.      losartan (Cozaar) 25 MG tablet Take 1 tablet by mouth Daily. 90 tablet 3    meloxicam (MOBIC) 15 MG tablet Take 1 tablet by mouth Daily.       No facility-administered medications prior to visit.     No opioid medication identified on active medication list. I have reviewed chart for other potential  high risk medication/s and harmful drug interactions in the elderly.      Aspirin is not on active medication list.  Aspirin use is not indicated based on review of current medical condition/s. Risk of harm outweighs potential benefits.  .    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  ACP discussion was declined by the patient. Patient has an advance directive (not in EMR), copy requested.            Objective   Vitals:    09/09/24 0827   BP: 134/86   BP Location: Left arm   Patient Position: Sitting   Cuff Size: Adult   Pulse: 85   Temp: 97.8 °F (36.6 °C)   SpO2: 100%   Weight: 76.7 kg (169 lb)   Height: 165.1 cm (65\")   PainSc: 0-No pain       Estimated body mass index is 28.12 kg/m² as calculated from the following:    Height as of this encounter: 165.1 cm (65\").    Weight as of this encounter: 76.7 kg (169 lb).            Does the patient have evidence of cognitive impairment? No  Lab Results   Component Value Date    CHLPL 232 (H) 09/06/2024    TRIG 165 (H) 09/06/2024    " HDL 60 2024     (H) 2024    VLDL 29 2024                                                                                               Health  Risk Assessment    Smoking Status:  Social History     Tobacco Use   Smoking Status Former    Current packs/day: 0.00    Average packs/day: 0.3 packs/day for 20.0 years (5.0 ttl pk-yrs)    Types: Cigarettes    Start date:     Quit date: 2000    Years since quittin.7    Passive exposure: Past   Smokeless Tobacco Never     Alcohol Consumption:  Social History     Substance and Sexual Activity   Alcohol Use Yes    Alcohol/week: 10.0 standard drinks of alcohol    Types: 10 Glasses of wine per week    Comment: wine daily       Fall Risk Screen  STEADI Fall Risk Assessment was completed, and patient is at LOW risk for falls.Assessment completed on:2024    Depression Screenin/9/2024     8:23 AM   PHQ-2/PHQ-9 Depression Screening   Little Interest or Pleasure in Doing Things 0-->not at all   Feeling Down, Depressed or Hopeless 0-->not at all   PHQ-9: Brief Depression Severity Measure Score 0     Health Habits and Functional and Cognitive Screenin/9/2024     8:26 AM   Functional & Cognitive Status   Do you have difficulty preparing food and eating? No   Do you have difficulty bathing yourself, getting dressed or grooming yourself? No   Do you have difficulty using the toilet? No   Do you have difficulty moving around from place to place? No   Do you have trouble with steps or getting out of a bed or a chair? No   Current Diet Well Balanced Diet   Dental Exam Up to date   Eye Exam Up to date   Exercise (times per week) 6 times per week   Current Exercises Include Walking   Do you need help using the phone?  No   Are you deaf or do you have serious difficulty hearing?  No   Do you need help to go to places out of walking distance? No   Do you need help shopping? No   Do you need help preparing meals?  No   Do you need help with  housework?  No   Do you need help with laundry? No   Do you need help taking your medications? No   Do you need help managing money? No   Do you ever drive or ride in a car without wearing a seat belt? No   Have you felt unusual stress, anger or loneliness in the last month? No   Who do you live with? Spouse   If you need help, do you have trouble finding someone available to you? No   Have you been bothered in the last four weeks by sexual problems? No   Do you have difficulty concentrating, remembering or making decisions? No           Age-appropriate Screening Schedule:  Refer to the list below for future screening recommendations based on patient's age, sex and/or medical conditions. Orders for these recommended tests are listed in the plan section. The patient has been provided with a written plan.    Health Maintenance List  Health Maintenance   Topic Date Due    INFLUENZA VACCINE  08/01/2024    DXA SCAN  09/16/2024    COVID-19 Vaccine (7 - 2023-24 season) 11/29/2024 (Originally 9/1/2024)    BMI FOLLOWUP  06/21/2025    LIPID PANEL  09/06/2025    ANNUAL WELLNESS VISIT  09/09/2025    MAMMOGRAM  09/21/2025    COLORECTAL CANCER SCREENING  08/09/2029    HEPATITIS C SCREENING  Completed    Pneumococcal Vaccine 65+  Completed    ZOSTER VACCINE  Completed    TDAP/TD VACCINES  Discontinued                                                                                                                                                CMS Preventative Services Quick Reference  Risk Factors Identified During Encounter  None Identified    The above risks/problems have been discussed with the patient.  Pertinent information has been shared with the patient in the After Visit Summary.  An After Visit Summary and PPPS were made available to the patient.    Follow Up:   Next Medicare Wellness visit to be scheduled in 1 year.     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 Weight is unchanged. BMI is above average; BMI management plan is completed. We discussed portion control and increasing exercise.     Orders Placed This Encounter   Procedures    Mammo Screening Digital Tomosynthesis Bilateral With CAD    DEXA Bone Density Axial     New Medications Ordered This Visit   Medications    losartan-hydrochlorothiazide (Hyzaar) 50-12.5 MG per tablet     Sig: Take 1 tablet by mouth Daily.     Dispense:  90 tablet     Refill:  3    meloxicam (MOBIC) 15 MG tablet     Sig: Take 1 tablet by mouth Daily.     Dispense:  90 tablet     Refill:  3    olopatadine (PATADAY) 0.2 % solution ophthalmic solution     Sig: Administer 1 drop to both eyes Daily.     Dispense:  7.5 mL     Refill:  3     Patient encouraged to partake of healthy diet rich in fresh fruits and vegetables as well as lean proteins.  Patient encouraged to participate in daily exercise with goal of 30 min sustained activity.     Follow Up:   Return in about 1 year (around 9/9/2025) for medicare wellness with labs prior.

## 2024-09-18 LAB — NCCN CRITERIA FLAG: ABNORMAL

## 2024-09-20 DIAGNOSIS — Z13.79 GENETIC TESTING: Primary | ICD-10-CM

## 2024-09-23 ENCOUNTER — HOSPITAL ENCOUNTER (OUTPATIENT)
Dept: BONE DENSITY | Facility: HOSPITAL | Age: 74
Discharge: HOME | End: 2024-09-23
Payer: MEDICARE

## 2024-09-23 ENCOUNTER — HOSPITAL ENCOUNTER (OUTPATIENT)
Dept: MAMMOGRAPHY | Facility: HOSPITAL | Age: 74
Discharge: HOME | End: 2024-09-23
Payer: MEDICARE

## 2024-09-23 ENCOUNTER — LAB (OUTPATIENT)
Dept: LAB | Facility: HOSPITAL | Age: 74
End: 2024-09-23
Payer: MEDICARE

## 2024-09-23 DIAGNOSIS — Z12.31 BREAST CANCER SCREENING BY MAMMOGRAM: ICD-10-CM

## 2024-09-23 DIAGNOSIS — Z78.0 POST-MENOPAUSAL: ICD-10-CM

## 2024-09-23 DIAGNOSIS — Z13.79 GENETIC TESTING: ICD-10-CM

## 2024-09-23 PROCEDURE — 77063 BREAST TOMOSYNTHESIS BI: CPT

## 2024-09-23 PROCEDURE — 77080 DXA BONE DENSITY AXIAL: CPT

## 2024-09-23 PROCEDURE — 77067 SCR MAMMO BI INCL CAD: CPT

## 2024-09-23 PROCEDURE — 36415 COLL VENOUS BLD VENIPUNCTURE: CPT

## 2024-10-15 LAB
ALLELIC STATE: NORMAL
AMBRY INTERPRETATION REPORT: NORMAL
AMINO ACID CHANGE: NORMAL
CHROMOSOME BLD/T: 11
DNA CHANGE: NORMAL
GEN ALLELE LOC ID: NORMAL
GENE DIS ANL INTERP-IMP: NORMAL
GENE DIS SEQ VAR INTERP-IMP: NORMAL
GENE STUDIED ID: NORMAL
GENETIC VAR ASSESS: PRESENT
GENOMIC SOURCE CLASS: NORMAL
HUMAN REF SEQ ASSEMBLY+BUILD: NORMAL
SIMPLE VAR ID: NORMAL
SIMPLE VAR ID: NORMAL
TRANSCRIPT REF SEQUENCE ID: NORMAL
VARIANT CATEGORY: NORMAL

## 2024-10-21 ENCOUNTER — TELEPHONE (OUTPATIENT)
Dept: GENETICS | Facility: HOSPITAL | Age: 74
End: 2024-10-21
Payer: MEDICARE

## 2024-10-21 ENCOUNTER — TELEPHONE (OUTPATIENT)
Dept: OTOLARYNGOLOGY | Facility: CLINIC | Age: 74
End: 2024-10-21
Payer: MEDICARE

## 2024-10-21 ENCOUNTER — OFFICE VISIT (OUTPATIENT)
Dept: OTOLARYNGOLOGY | Facility: CLINIC | Age: 74
End: 2024-10-21
Payer: MEDICARE

## 2024-10-21 VITALS
DIASTOLIC BLOOD PRESSURE: 89 MMHG | SYSTOLIC BLOOD PRESSURE: 133 MMHG | HEIGHT: 65 IN | BODY MASS INDEX: 28.49 KG/M2 | WEIGHT: 171 LBS | TEMPERATURE: 98.6 F | HEART RATE: 85 BPM

## 2024-10-21 DIAGNOSIS — K21.9 LARYNGOPHARYNGEAL REFLUX (LPR): ICD-10-CM

## 2024-10-21 DIAGNOSIS — Q31.8 ARYTENOID ANOMALY: ICD-10-CM

## 2024-10-21 DIAGNOSIS — Z87.891 FORMER SMOKER: ICD-10-CM

## 2024-10-21 DIAGNOSIS — R09.A2 GLOBUS PHARYNGEUS: ICD-10-CM

## 2024-10-21 DIAGNOSIS — R09.A2 GLOBUS SENSATION: Primary | ICD-10-CM

## 2024-10-21 PROCEDURE — 1159F MED LIST DOCD IN RCRD: CPT | Performed by: OTOLARYNGOLOGY

## 2024-10-21 PROCEDURE — 31575 DIAGNOSTIC LARYNGOSCOPY: CPT | Performed by: OTOLARYNGOLOGY

## 2024-10-21 PROCEDURE — 3075F SYST BP GE 130 - 139MM HG: CPT | Performed by: OTOLARYNGOLOGY

## 2024-10-21 PROCEDURE — 1160F RVW MEDS BY RX/DR IN RCRD: CPT | Performed by: OTOLARYNGOLOGY

## 2024-10-21 PROCEDURE — 99213 OFFICE O/P EST LOW 20 MIN: CPT | Performed by: OTOLARYNGOLOGY

## 2024-10-21 PROCEDURE — 3079F DIAST BP 80-89 MM HG: CPT | Performed by: OTOLARYNGOLOGY

## 2024-10-21 NOTE — PATIENT INSTRUCTIONS
">NASAL SALINE:  Use 2 puffs each nostril 4-6 times daily and more frequently if possible.  You can buy saline spray or you can make your own and use an old spray bottle to administer  Use a humidifier at bedside  Recipe for saline:  Water                                 1 quart  Salt (table)                        1 tablespoon  Gylcerin (or Usha Syrup)    1 teaspoon  Sodium bicarbonate           1 teaspoon  Sprays or Yarelis pots are recommended    Do not allow to stand for more than 24 hrs. Make new solution. There is no preservative in this solution.    Sinus irrigation and saline application can be enhanced with various over-the-counter products.  A WaterPik can be quite useful to irrigate, especially following sinus surgery.  Navage makes a product that irrigates the nose and some of the sinuses.  NeilMed makes a Sinu-Gator to irrigate the sinuses.  Neomed also has canned saline that we will come out under pressure.  A Yarelis pot can also be helpful.  All of these products help keep the nose clear of debris.  Please use as directed on the instructions that come with the particular device.     >NASAL STEROID use:  Using nasal steroids:  You will be prescribed one of the following nasal steroids: Flonase, Nasacort, Nasonex, Rhinocort, Qnasl, Zetonna  2 puffs each nostril 2 times daily  Start as soon as possible  If you are using Afrin for 3 days with the nasal steroid,  Use Afrin first and wait 10 minutes to allow the nose to open. Then administer nasal steroids.     THE PROBLEM OF ACID REFLUX    In BOTH children and adults, irritating acids may leak from the stomach and come up into the esophagus and throat. This can occur at any time, but occurs more commonly when lying down. When the acid touches the lining of the esophagus, there is irritation and muscle spasm, which can be felt up into the throat. Usually this is felt as \"heartburn\". When scarring of the esophagus occurs, the esophagus becomes less sensitive " and the symptoms may only be in the throat.     Some of the symptoms that can occur with acid reflux into the throat include coughing, soreness, burning, hoarseness, throat clearing, excessive mucous, bad taste in the mouth, bad breath, a sensation of a lump in the throat, wheezing, post-nasal drip, choking spells, bleeding and nausea. In a small percentage of people, more serious problems may result, such as pneumonia, ulcers in the larynx (voice box), reduced mobility of the vocal cords and a pouch in the upper esophagus (diverticulum). In children, the symptoms may be different and include persistent vomiting, bleeding from the esophagus, respiratory problems, pneumonia, asthma, choking spells, swallowing problems and anemia. In some cases, unexplained fussiness and crying is due to reflux.     The following instructions are designed to help neutralize the stomach acid, reduce the production of the acid, promote emptying of the acid from the stomach into the intestines and prevent acid from coming up into the esophagus. You should adopt enough of these changes to alleviate your symptoms. If carrying out these suggestions produces no change, it is imperative that you return so that we can discuss further the problem. More testing may be required, as might medication. It is important to realize that the esophagus takes time to heal, so you should not expect results immediately.    Diet  Most often, the throat symptoms above are due to dietary abuses. Certain foods are known to cause irritation, because they are acids themselves or cause excess production of stomach acid. These should be avoided, if possible. The following is a partial list of foods recognized as troublesome: coffee (even decaffeinated), tea chocolate, cola beverages, citrus beverages (such as 7-Up), caffeine containing beverages, alcohol, citrus fruits and juices, highly spiced foods, fatty foods, candies, nuts, mints, milk and milk products. Some  of the worst offenders for promoting stomach acid are milk and beer.     Hard candies, gum, breath fresheners, throat lozenges, cough drops, mouthwashes, gargles, may actually irritate the throat directly (many cough drops and lozenges contain irritants such as oil of eucalyptus and menthol), and can also stimulate acid production directly.     Large amounts of liquid in the diet tend to promote reflux, because liquids tend to come back up more easily than solids.     Meals should be bland and consist of real food, trying to avoid recreational food. Multiple small meals, rather than one or two large meals helps prevent reflux by not stretching the stomach and increasing the time needed for digestion, as well increasing the amount of acid needed to digest the meal. If you are overweight, losing weight is tremendously helpful.     YOU SHOULD NOT EAT FOR AT LEAST TWO HOURS BEFORE RETIRING AND YOU SHOULD REMAIN SITTING OR STANDING FOR AT LEAST 45 MINUTES AFTER EACH MEAL. This promotes passage of food from the stomach into the intestine.    Posture  Body weight is a significant factor in promoting reflux. Losing weight is beneficial. Pregnancy will markedly increase the symptoms of heartburn and throat pain. You should avoid clothing that fits tightly across the mid-section, as this promotes squeezing of the abdominal contents upward. It is helpful to practice abdominal or diaphragmatic breathing, using the stomach to push out each breath rather than chest expansion. Avoid slumping while sitting, and avoid stooping or straining.     For many, the reflux occurs at night while sleeping. This is the time acid production is the greatest and you are lying down, a position that promotes reflux, thus setting up the irritation that occurs the next morning. One of the most important things you can do is to elevate the head of the bed, in an effort to use gravity to keep the acid in the stomach. This is accomplished by placed  blocks or bricks beneath the legs at the head of the bed, raising the head 6-10 inches. Do not make the head so high that you slide down. Pillows are not effective because they cause the body to curl, increasing abdominal pressure and it is difficult to remain upright on them. Additionally, pillows promote sleeping on the back, but it is far more desirable to sleep on the right side or on the stomach, as this allows gas to escape, while preventing the escape of acid material. Children and infants, who are refluxing, should sleep on their stomachs.  Exercise  Regular exercise is extremely beneficial in promoting good digestion and regularity. The abdominal muscles are toned, which aids in controlling acid problems. However, it is recommended that you not participate in vigorous activity immediately after eating. This causes pressure on an already full stomach, forcing acid up into the esophagus. Regular exercise is encouraged, prior to meals, or two hours after meals.  Antacids  Antacids should be used regularly, as they neutralize excess acid. Gelusil II, Mylanta II, Tums and Rolaids are popular brands. Gaviscon is not an antacid, but floats on top of the stomach acid, preventing esophageal irritation. Care should be used in administering large doses of antacids, especially in children. Many antacid preparations contain magnesium, which promotes frequent bowel movements, while antacids that contain aluminum can be constipating. Tums have a high calcium content that can be used to some advantage in older women with reflux.     Antacid tablets are convenient, but the liquid form tends to work much more quickly. Also remember to check with your physician about interference with other medications. Antacids can slow the absorption of digitalis, Indocin, tetracyclines, fluorides and isoniazid from the intestines. Many medications require the presence of stomach acid to be absorbed.     Initially, antacids should be used  30 minutes after each meal, 2 hours after each meal and at bedtime. This maximizes the neutralization of the stomach acid. Antacids can be used more frequently if symptoms persist, but such extensive use needs to be reviewed with your physician.  Prescription Medications  If the above recommendations are not effectively resolving the symptoms, medications may be prescribed. These are usually in the form of acid production blockers, such as Tagamet, Zantac, Pepcid, or Axid or acid pump inhibitors like Prevacid, Nexium, Protonix or Prilosec. These drugs help stop acid production in the stomach. Medications such as Reglan can be used to promote stomach emptying.  Medications That Promote Reflux  Certain medications can promote acid reflux; either by relaxing the sphincter muscle that helps keeps stomach acid down, or increasing the acid production. These include progesterone (Provera, Ortho Novum, Ovral, other birth control pills), theophylline (Gregory-Dur, etc.), anticholinergic (Donnatal, Scopolamine, Probanthine, Bentil, others), beta blockers (Inderal, Tenormin and Corgard), alpha blockers (Dibenzyline), dopamine, calcium channel blockers (Procardia, Cardizem, Isotin, Calan), aspirin, non-steroidal anti-inflammatory drugs (Motrin, Advil, Nuprin, Nalfon, Rufen, Indocin, Feldene, Clinoril and Tolectin). Vitamin C is an acid and can promote reflux. This is only a partial list and before stopping any prescription medication, you should check with your physician.    Goal  The goal is to reduce the symptoms with the least number of lifestyle changes. A combination of the above suggestions is frequently prescribed to return you to normal as quickly as possible. However, this problem did not develop overnight and will not disappear rapidly. Therefore, developing a regimen will aid in controlling symptoms and keep you symptom free for a long period of time. Other alternatives exist, should these suggestions fail, and can be  discussed with your physician.     To Summarize:  Watch your diet, avoid foods that cause acid reflux  Lose weight  Avoid tight fitting clothes  Adjust your posture, raise the head of the bed  Exercise regularly  Use antacids  Use prescription medication as directed  Avoid medications that promote reflux  Avoid behavior and eating habits that promote reflux of stomach acid     You are welcome to do a Google search on the topic of reflux and reflux diets. The internet has many useful resources.     Please remember, your success in controlling this condition depends on many factors including diet, exercise, medications and follow up. All are important and must be utilized to achieve success.      Continue Prilosec    ANTACID USE:  Use antacids 30 mins after every meal, 2 hours after every meal and at Bedtime  Use Gaviscon Extra (best), Tums, Rolaids, etc  Over the counter  Use 2 tsp or 2 tabs as the dose  Remember that some of these products contain Calcium or Aluminum. If you have dietary or medical issues, please inform physician    CT neck ordered    Diet  Exercise    Call for problems       CONTACT INFORMATION:  The main office phone number is [phone]. For emergencies after hours and on weekends, this number will convert over to our answering service and the on call provider will answer. Please try to keep non emergent phone calls/ questions to office hours 9am-5pm Monday through Friday.     [employer]-Tech  As an alternative, you can sign up and use the Epic MyChart system for more direct and quicker access for non emergent questions/ problems.  [email or web address] allows you to send messages to your doctor, view your test results, renew your prescriptions, schedule appointments, and more. To sign up, go to [employer] and click on the Sign Up Now link in the New User? box. Enter your [employer]-Tech Activation Code exactly as it appears below along with the last four digits of your Social Security Number  and your Date of Birth () to complete the sign-up process. If you do not sign up before the expiration date, you must request a new code.    AdRoll Activation Code: Activation code not generated  Current AdRoll Status: Active    If you have questions, you can email [email or web address] or call [phone] to talk to our [employer] staff. Remember, AdRoll is NOT to be used for urgent needs. For medical emergencies, dial 911.

## 2024-10-21 NOTE — PROGRESS NOTES
Fab Hirsch Jr, MD  [facility]  [address], [address]  [place] [address]  Fax [phone]  Phone [phone]      Visit Type: FOLLOW UP   Chief Complaint   Patient presents with    Globus sensation     No improvement, sensation is consistent            HPI  Accompanied by:   She presents for a follow up evaluation. Has had something in throat since 5/2024. Had a sore throat. Now with sensation. No chenge.  Has had no choking. Has been diagnosis with inclusion cyst.  Medications- Flonase and Prilosec.   Denies post-nasal drainage. No choking.  Sensation most prevalent R side Hyoid level    Past Medical History:   Diagnosis Date    Allergic     Breast cancer 2000    right    Eczema     History of transfusion     Hx of radiation therapy 2000    Hypertension        Past Surgical History:   Procedure Laterality Date    BREAST BIOPSY Right 2000    cancer    BREAST LUMPECTOMY Right 2000    COLONOSCOPY      KNEE ARTHROSCOPY Left 03/03/2021    Procedure: arthroscopy of left knee with debridement of medial and lateral meniscus;  Surgeon: Jamil Lee MD;  Location: [facility];  Service: Orthopedics;  Laterality: Left;    KNEE SURGERY Left     PARATHYROIDECTOMY      THYROIDECTOMY, PARTIAL Right     TUBAL ABDOMINAL LIGATION      WIDE EXCISION LESION/MASS TRUNK Right 6/19/2023    Procedure: 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     Social History: She  reports that she quit smoking about 24 years ago. Her smoking use included cigarettes. She started smoking about 44 years ago. She has a  5 pack-year smoking history. She has been exposed to tobacco smoke. She has never used smokeless tobacco. She reports current alcohol use of about 10.0 standard drinks of alcohol per week. She reports that she does not use drugs.    Home Medications:  desonide, fluticasone, losartan-hydrochlorothiazide, meloxicam, olopatadine, omeprazole, pimecrolimus, and triamcinolone    Allergies:  She has No Known Allergies.       Vital Signs:      ENT Physical Exam  Constitutional  Appearance: patient appears well-developed, well-nourished and well-groomed, patient is cooperative;  Communication/Voice: communication appropriate for developmental age; vocal quality normal;  Head and Face  Appearance: head appears normal, face appears normal and face appears atraumatic;  Palpation: facial palpation normal;  Ear  Hearing: intact;  Auricles: bilateral auricles normal;  External Mastoids: bilateral external mastoids normal;  Ear Canals: bilateral ear canals normal;  Tympanic Membranes: bilateral tympanic membranes normal;  Nose  External Nose: nares patent bilaterally; external nose normal;  Internal Nose: bilateral intranasal mucosa erythematous; bilateral inferior turbinates normal;  Oral Cavity/Oropharynx  Lips: normal;  Teeth: normal;  Gums: gingiva normal;  Tongue: normal;  Oral mucosa: normal;  Hard palate: normal;  Soft palate: normal;  Tonsils: normal; Tonsil comments: Inclusion cyst right tonsil  Base of Tongue: normal;  Posterior pharyngeal wall: appearance is erythematous and inflamed;  Neck  Neck: neck normal; neck palpation normal;  Respiratory  Inspection: breathing unlabored; normal breathing rate;  Cardiovascular  Inspection: extremities are warm and well perfused;  Lymphatic  Palpation: lymph nodes normal;  Neurovestibular  Mental Status: alert and oriented;       Laryngoscopy    Date/Time: 10/21/2024 10:52 AM    Performed by: Fab Hirsch Jr., MD  Authorized by: Fab Hirsch Jr., MD    Consent:      Consent obtained:  Verbal    Consent given by:  Patient  Anesthesia (see MAR for exact dosages):     Anesthesia method:  Topical application    Topical anesthetic:  Tetracaine  Procedure details:     Indications: hoarseness, dysphagia, or aspiration      Medication:  Afrin    Instrument: flexible fiberoptic laryngoscope      Scope location: left nare    Sinus/ Nasopharynx:     Right nasopharynx: normal and patent      Left nasopharynx: normal and patent      Right eustachian tube: normal and patent      Left eustachian tube: normal and patent    Oropharynx/ Supraglottis:     Posterior pharyngeal wall: inflamed      Oropharynx: inflammation      Oropharynx: no stenosis      Vallecula: inflammation      Vallecula: no lesion      Base of tongue: lingual tonsillar hypertrophy (Moderate) and inflammation      Base of tongue: no lesion      Epiglottis: inflammation and retroflexed      Epiglottis: no lesions    Larynx/ Hypopharynx:     Arytenoids: inflammation and interarytenoid edema      Arytenoids: no lesions       comment:  Right cuneiform cartilage appears hypertrophied with he can mucosa over the top    Pyriform sinus: inflammation      Pyriform sinus: no lesion and no pooling of secretions      False vocal cords: inflammation      True vocal cords: normal    Post-procedure details:     Patient tolerance of procedure:  Tolerated well  Comments:      Mild inflammation oropharynx, hypopharynx, larynx  Right arytenoid appears to have an enlarged cuneiform cartilage.  There is no overlying mucosal lesion.  The mucosa is thickened and erythematous  True vocal cords appear normal today.  There is lingual tonsil hypertrophy with contact with the epiglottis.     Result Review       RESULTS REVIEW    I have reviewed the patients old records in the chart.   I have reviewed the patients old records in the chart.        Assessment & Plan  Globus sensation  No real change  Former smoker    Arytenoid anomaly  Right cuneiform  cartilage hypertrophy  Globus pharyngeus  No change  Laryngopharyngeal reflux (LPR)  Mildly improved     Orders Placed This Encounter   Procedures    CT Soft Tissue Neck With Contrast    Flexible laryngoscopy         Medical and surgical options were discussed including observation, continued medical management, medication modification, surgical management, CT scanning, and university referral. Risks, benefits and alternatives were discussed and questions were answered. After considering the options, the patient decided to proceed with medication modification and CT scanning.  Patient appears to have improved voice.  She still has signs of reflux low in the hypopharynx and larynx.  I see no lesions.  She does have right arytenoid cuneiform cartilage that appears to be hypertrophied.  There is no overlying mucosal ulceration or edema.  I will obtain CT scan to evaluate the arytenoid.  Reflux precautions  Antacids  CT scan of the neck  Flonase twice daily  Nasal saline  Call for problems    My Chart:  Patient is using My Chart    Patient, Spouse understand(s) and agree(s) with the treatment plan as described.    Return RTC after CT neck, for Recheck Globus.        Electronically signed by Fab Hirsch Jr, MD 10/21/24 11:01 AM CDT.

## 2024-10-21 NOTE — TELEPHONE ENCOUNTER
Left msg for to inform her that after Dr rajan reviews her CT+, he will let us know when he wants to see you back. I will call you with the results and set your follow up appt at that time

## 2024-10-21 NOTE — TELEPHONE ENCOUNTER
I contacted the patient to make sure she received her genetic test results from [email or web address].  She voiced understanding the results disclosre.

## 2024-11-01 ENCOUNTER — HOSPITAL ENCOUNTER (OUTPATIENT)
Dept: CT IMAGING | Facility: HOSPITAL | Age: 74
Discharge: HOME | End: 2024-11-01
Payer: MEDICARE

## 2024-11-01 DIAGNOSIS — Q31.8 ARYTENOID ANOMALY: ICD-10-CM

## 2024-11-01 DIAGNOSIS — R09.A2 GLOBUS PHARYNGEUS: ICD-10-CM

## 2024-11-01 DIAGNOSIS — R09.A2 GLOBUS SENSATION: ICD-10-CM

## 2024-11-01 PROCEDURE — 70491 CT SOFT TISSUE NECK W/DYE: CPT

## 2024-11-01 PROCEDURE — 25510000001 IOPAMIDOL 61 % SOLUTION: Performed by: OTOLARYNGOLOGY

## 2024-11-01 RX ORDER — IOPAMIDOL 612 MG/ML
100 INJECTION, SOLUTION INTRAVASCULAR
Status: COMPLETED | OUTPATIENT
Start: 2024-11-01 | End: 2024-11-01

## 2024-11-01 RX ADMIN — IOPAMIDOL 100 ML: 612 INJECTION, SOLUTION INTRAVENOUS at 11:06

## 2024-12-02 ENCOUNTER — TELEPHONE (OUTPATIENT)
Dept: OTOLARYNGOLOGY | Facility: CLINIC | Age: 74
End: 2024-12-02
Payer: MEDICARE

## 2024-12-02 NOTE — TELEPHONE ENCOUNTER
----- Message from Fab Hirsch sent at 11/28/2024 11:53 AM CST -----  Regarding: CT scan results  Please call patient tell her I reviewed the CT scan.  It shows what I saw on her examination.  1 side of the voicebox cartilage is enlarged.  I would like to see her back to evaluate this area again to be sure that nothing is changing.  ----- Message -----  From: [employer], Rad Results [place] In  Sent: 11/1/2024  12:24 PM CST  To: Fab Hirsch Jr., MD

## 2024-12-03 ENCOUNTER — TELEPHONE (OUTPATIENT)
Dept: OTOLARYNGOLOGY | Facility: CLINIC | Age: 74
End: 2024-12-03
Payer: MEDICARE

## 2024-12-03 NOTE — TELEPHONE ENCOUNTER
----- Message from Fab Hirsch sent at 11/28/2024 11:53 AM CST -----  Regarding: CT scan results  Please call patient tell her I reviewed the CT scan.  It shows what I saw on her examination.  1 side of the voicebox cartilage is enlarged.  I would like to see her back to evaluate this area again to be sure that nothing is changing.  ----- Message -----  From: [employer], Rad Results [place] In  Sent: 11/1/2024  12:24 PM CST  To: Fab Hirsch Jr., MD

## 2024-12-03 NOTE — TELEPHONE ENCOUNTER
"Left Vm for patient with results of CT neck per Dr Hirsch 'Please call patient tell her I reviewed the CT scan.  It shows what I saw on her examination.  1 side of the voicebox cartilage is enlarged.  I would like to see her back to evaluate this area again to be sure that nothing is changing. \"Pt has follow up cb with Dr Hirsch on 1-20-25 at 1:45. Call office with any questions.           "

## 2025-03-24 RX ORDER — OMEPRAZOLE 40 MG/1
CAPSULE, DELAYED RELEASE ORAL
Qty: 90 CAPSULE | Refills: 3 | OUTPATIENT
Start: 2025-03-24

## 2025-04-02 ENCOUNTER — OFFICE VISIT (OUTPATIENT)
Dept: OTOLARYNGOLOGY | Facility: CLINIC | Age: 75
End: 2025-04-02
Payer: MEDICARE

## 2025-04-02 DIAGNOSIS — R09.A2 GLOBUS SENSATION: Primary | ICD-10-CM

## 2025-04-02 DIAGNOSIS — Z87.891 FORMER SMOKER: ICD-10-CM

## 2025-04-02 DIAGNOSIS — R09.A2 GLOBUS PHARYNGEUS: ICD-10-CM

## 2025-04-02 DIAGNOSIS — E07.89 THYROID MASS OF UNCLEAR ETIOLOGY: ICD-10-CM

## 2025-04-02 DIAGNOSIS — K21.9 LARYNGOPHARYNGEAL REFLUX (LPR): ICD-10-CM

## 2025-04-02 DIAGNOSIS — Q31.8 ARYTENOID ANOMALY: ICD-10-CM

## 2025-04-02 DIAGNOSIS — E04.1 THYROID NODULE: ICD-10-CM

## 2025-04-02 NOTE — PATIENT INSTRUCTIONS
"Thyroid Ultrasound next visit    >NASAL SALINE:  Use 2 puffs each nostril 4-6 times daily and more frequently if possible.  You can buy saline spray or you can make your own and use an old spray bottle to administer  Use a humidifier at bedside  Recipe for saline:  Water                                 1 quart  Salt (table)                        1 tablespoon  Gylcerin (or Usha Syrup)    1 teaspoon  Sodium bicarbonate           1 teaspoon  Sprays or [place] pots are recommended    Do not allow to stand for more than 24 hrs. Make new solution. There is no preservative in this solution.    Sinus irrigation and saline application can be enhanced with various over-the-counter products.  A WaterPik can be quite useful to irrigate, especially following sinus surgery.  Navage makes a product that irrigates the nose and some of the sinuses.  NeilMed makes a Sinu-Gator to irrigate the sinuses.  Neomed also has canned saline that we will come out under pressure.  A Yarelis pot can also be helpful.  All of these products help keep the nose clear of debris.  Please use as directed on the instructions that come with the particular device.     THE PROBLEM OF ACID REFLUX    In BOTH children and adults, irritating acids may leak from the stomach and come up into the esophagus and throat. This can occur at any time, but occurs more commonly when lying down. When the acid touches the lining of the esophagus, there is irritation and muscle spasm, which can be felt up into the throat. Usually this is felt as \"heartburn\". When scarring of the esophagus occurs, the esophagus becomes less sensitive and the symptoms may only be in the throat.     Some of the symptoms that can occur with acid reflux into the throat include coughing, soreness, burning, hoarseness, throat clearing, excessive mucous, bad taste in the mouth, bad breath, a sensation of a lump in the throat, wheezing, post-nasal drip, choking spells, bleeding and nausea. In a small " percentage of people, more serious problems may result, such as pneumonia, ulcers in the larynx (voice box), reduced mobility of the vocal cords and a pouch in the upper esophagus (diverticulum). In children, the symptoms may be different and include persistent vomiting, bleeding from the esophagus, respiratory problems, pneumonia, asthma, choking spells, swallowing problems and anemia. In some cases, unexplained fussiness and crying is due to reflux.     The following instructions are designed to help neutralize the stomach acid, reduce the production of the acid, promote emptying of the acid from the stomach into the intestines and prevent acid from coming up into the esophagus. You should adopt enough of these changes to alleviate your symptoms. If carrying out these suggestions produces no change, it is imperative that you return so that we can discuss further the problem. More testing may be required, as might medication. It is important to realize that the esophagus takes time to heal, so you should not expect results immediately.    Diet  Most often, the throat symptoms above are due to dietary abuses. Certain foods are known to cause irritation, because they are acids themselves or cause excess production of stomach acid. These should be avoided, if possible. The following is a partial list of foods recognized as troublesome: coffee (even decaffeinated), tea chocolate, cola beverages, citrus beverages (such as 7-Up), caffeine containing beverages, alcohol, citrus fruits and juices, highly spiced foods, fatty foods, candies, nuts, mints, milk and milk products. Some of the worst offenders for promoting stomach acid are milk and beer.     Hard candies, gum, breath fresheners, throat lozenges, cough drops, mouthwashes, gargles, may actually irritate the throat directly (many cough drops and lozenges contain irritants such as oil of eucalyptus and menthol), and can also stimulate acid production directly.      Large amounts of liquid in the diet tend to promote reflux, because liquids tend to come back up more easily than solids.     Meals should be bland and consist of real food, trying to avoid recreational food. Multiple small meals, rather than one or two large meals helps prevent reflux by not stretching the stomach and increasing the time needed for digestion, as well increasing the amount of acid needed to digest the meal. If you are overweight, losing weight is tremendously helpful.     YOU SHOULD NOT EAT FOR AT LEAST TWO HOURS BEFORE RETIRING AND YOU SHOULD REMAIN SITTING OR STANDING FOR AT LEAST 45 MINUTES AFTER EACH MEAL. This promotes passage of food from the stomach into the intestine.    Posture  Body weight is a significant factor in promoting reflux. Losing weight is beneficial. Pregnancy will markedly increase the symptoms of heartburn and throat pain. You should avoid clothing that fits tightly across the mid-section, as this promotes squeezing of the abdominal contents upward. It is helpful to practice abdominal or diaphragmatic breathing, using the stomach to push out each breath rather than chest expansion. Avoid slumping while sitting, and avoid stooping or straining.     For many, the reflux occurs at night while sleeping. This is the time acid production is the greatest and you are lying down, a position that promotes reflux, thus setting up the irritation that occurs the next morning. One of the most important things you can do is to elevate the head of the bed, in an effort to use gravity to keep the acid in the stomach. This is accomplished by placed blocks or bricks beneath the legs at the head of the bed, raising the head 6-10 inches. Do not make the head so high that you slide down. Pillows are not effective because they cause the body to curl, increasing abdominal pressure and it is difficult to remain upright on them. Additionally, pillows promote sleeping on the back, but it is far more  desirable to sleep on the right side or on the stomach, as this allows gas to escape, while preventing the escape of acid material. Children and infants, who are refluxing, should sleep on their stomachs.  Exercise  Regular exercise is extremely beneficial in promoting good digestion and regularity. The abdominal muscles are toned, which aids in controlling acid problems. However, it is recommended that you not participate in vigorous activity immediately after eating. This causes pressure on an already full stomach, forcing acid up into the esophagus. Regular exercise is encouraged, prior to meals, or two hours after meals.  Antacids  Antacids should be used regularly, as they neutralize excess acid. Gelusil II, Mylanta II, Tums and Rolaids are popular brands. Gaviscon is not an antacid, but floats on top of the stomach acid, preventing esophageal irritation. Care should be used in administering large doses of antacids, especially in children. Many antacid preparations contain magnesium, which promotes frequent bowel movements, while antacids that contain aluminum can be constipating. Tums have a high calcium content that can be used to some advantage in older women with reflux.     Antacid tablets are convenient, but the liquid form tends to work much more quickly. Also remember to check with your physician about interference with other medications. Antacids can slow the absorption of digitalis, Indocin, tetracyclines, fluorides and isoniazid from the intestines. Many medications require the presence of stomach acid to be absorbed.     Initially, antacids should be used 30 minutes after each meal, 2 hours after each meal and at bedtime. This maximizes the neutralization of the stomach acid. Antacids can be used more frequently if symptoms persist, but such extensive use needs to be reviewed with your physician.  Prescription Medications  If the above recommendations are not effectively resolving the symptoms,  medications may be prescribed. These are usually in the form of acid production blockers, such as Tagamet, Zantac, Pepcid, or Axid or acid pump inhibitors like Prevacid, Nexium, Protonix or Prilosec. These drugs help stop acid production in the stomach. Medications such as Reglan can be used to promote stomach emptying.  Medications That Promote Reflux  Certain medications can promote acid reflux; either by relaxing the sphincter muscle that helps keeps stomach acid down, or increasing the acid production. These include progesterone (Provera, Ortho Novum, Ovral, other birth control pills), theophylline (Gregory-Dur, etc.), anticholinergic (Donnatal, Scopolamine, Probanthine, Bentil, others), beta blockers (Inderal, Tenormin and Corgard), alpha blockers (Dibenzyline), dopamine, calcium channel blockers (Procardia, Cardizem, Isotin, Calan), aspirin, non-steroidal anti-inflammatory drugs (Motrin, Advil, Nuprin, Nalfon, Rufen, Indocin, Feldene, Clinoril and Tolectin). Vitamin C is an acid and can promote reflux. This is only a partial list and before stopping any prescription medication, you should check with your physician.    Goal  The goal is to reduce the symptoms with the least number of lifestyle changes. A combination of the above suggestions is frequently prescribed to return you to normal as quickly as possible. However, this problem did not develop overnight and will not disappear rapidly. Therefore, developing a regimen will aid in controlling symptoms and keep you symptom free for a long period of time. Other alternatives exist, should these suggestions fail, and can be discussed with your physician.     To Summarize:  Watch your diet, avoid foods that cause acid reflux  Lose weight  Avoid tight fitting clothes  Adjust your posture, raise the head of the bed  Exercise regularly  Use antacids  Use prescription medication as directed  Avoid medications that promote reflux  Avoid behavior and eating habits that  promote reflux of stomach acid     You are welcome to do a Google search on the topic of reflux and reflux diets. The internet has many useful resources.     Please remember, your success in controlling this condition depends on many factors including diet, exercise, medications and follow up. All are important and must be utilized to achieve success.      ANTACID USE:  Use antacids 30 mins after every meal, 2 hours after every meal and at Bedtime  Use Gaviscon Extra (best), Tums, Rolaids, etc  Over the counter  Use 2 tsp or 2 tabs as the dose  Remember that some of these products contain Calcium or Aluminum. If you have dietary or medical issues, please inform physician    Call for problems     CONTACT INFORMATION:  The main office phone number is [phone]. For emergencies after hours and on weekends, this number will convert over to our answering service and the on call provider will answer. Please try to keep non emergent phone calls/ questions to office hours 9am-5pm Monday through Friday.     [facility]  As an alternative, you can sign up and use the Epic MyChart system for more direct and quicker access for non emergent questions/ problems.  [employer] allows you to send messages to your doctor, view your test results, renew your prescriptions, schedule appointments, and more. To sign up, go to datapine and click on the Sign Up Now link in the New User? box. Enter your [facility] Activation Code exactly as it appears below along with the last four digits of your Social Security Number and your Date of Birth () to complete the sign-up process. If you do not sign up before the expiration date, you must request a new code.    [facility] Activation Code: Activation code not generated  Current [facility] Status: Active    If you have questions, you can email [email or web address] or call [phone] to talk to our [facility] staff. Remember, [facility] is NOT to be used for urgent needs.  For medical emergencies, dial 911.

## 2025-04-02 NOTE — PROGRESS NOTES
Fab Hirsch Jr, MD  [facility]  [address], [address]  [place] [address]  Fax [phone]  Phone [phone]      Visit Type: FOLLOW UP   Chief Complaint   Patient presents with    Difficulty Swallowing     No change since last visit            HISTORY OBTAINED FROM: patient  Accompanied by:No one  HPI  She presents for a follow up evaluation. She has had CT scan. Had Follow up thyroid scan in 2/2025. Told to see MD.  Swallowing same. No weight loss. No real changes.  She says feels something in throat. Worse in am. Better later in the day.  No real overall change in her symptoms.      History of Present Illness      Past Medical History:   Diagnosis Date    Allergic     Breast cancer 2000    right    Eczema     History of transfusion     Hx of radiation therapy 2000    Hypertension        Past Surgical History:   Procedure Laterality Date    BREAST BIOPSY Right 2000    cancer    BREAST LUMPECTOMY Right 2000    COLONOSCOPY      KNEE ARTHROSCOPY Left 03/03/2021    Procedure: arthroscopy of left knee with debridement of medial and lateral meniscus;  Surgeon: Jamil Lee MD;  Location: [facility];  Service: Orthopedics;  Laterality: Left;    KNEE SURGERY Left     PARATHYROIDECTOMY      THYROIDECTOMY, PARTIAL Right     TUBAL ABDOMINAL LIGATION      WIDE EXCISION LESION/MASS TRUNK Right 6/19/2023    Procedure: 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     Social History: She  reports that she quit smoking about 25 years ago. Her smoking use included cigarettes. She started smoking about  45 years ago. She has a 5 pack-year smoking history. She has been exposed to tobacco smoke. She has never used smokeless tobacco. She reports current alcohol use of about 10.0 standard drinks of alcohol per week. She reports that she does not use drugs.    Home Medications:  desonide, fluticasone, losartan-hydrochlorothiazide, meloxicam, olopatadine, omeprazole, pimecrolimus, and triamcinolone    Allergies:  She has no known allergies.       Vital Signs:   BP: ()/()   Arterial Line BP: ()/()   ENT Physical Exam  Constitutional  Appearance: patient appears well-developed, well-nourished and well-groomed, patient is cooperative;  Communication/Voice: communication appropriate for developmental age; vocal quality normal;  Head and Face  Appearance: head appears normal, face appears normal and face appears atraumatic;  Palpation: facial palpation normal;  Ear  Hearing: intact;  Auricles: bilateral auricles normal;  External Mastoids: bilateral external mastoids normal;  Ear Canals: bilateral ear canals normal;  Tympanic Membranes: bilateral tympanic membranes normal;  Nose  External Nose: nares patent bilaterally; external nose normal;  Internal Nose: bilateral intranasal mucosa erythematous; bilateral inferior turbinates normal;  Oral Cavity/Oropharynx  Lips: normal;  Teeth: normal;  Gums: gingiva normal;  Tongue: normal;  Oral mucosa: normal;  Hard palate: normal;  Soft palate: normal;  Tonsils: normal; Tonsil comments: Inclusion cyst right tonsil  Base of Tongue: normal;  Posterior pharyngeal wall: appearance is erythematous and inflamed;  Neck  Neck: neck normal; neck palpation normal;  Respiratory  Inspection: breathing unlabored; normal breathing rate;  Cardiovascular  Inspection: extremities are warm and well perfused;  Lymphatic  Palpation: lymph nodes normal;  Neurovestibular  Mental Status: alert and oriented;       Laryngoscopy    Date/Time: 4/2/2025 10:00 AM    Performed by: Fab Hirsch Jr.,  MD  Authorized by: Fab Hirsch Jr., MD    Consent:     Consent obtained:  Verbal    Consent given by:  Patient    Alternatives discussed:  No treatment  Anesthesia (see MAR for exact dosages):     Anesthesia method:  Topical application    Topical anesthetic:  Tetracaine  Procedure details:     Indications: hoarseness, dysphagia, or aspiration      Medication:  Afrin    Instrument: flexible fiberoptic laryngoscope      Scope location: left nare    Sinus/ Nasopharynx:     Right nasopharynx: patent and inflammation      Left nasopharynx: patent and inflammation      Right eustachian tube: patent      Left eustachian tube: inflammation, patent and inflammation    Oropharynx/ Supraglottis:     Posterior pharyngeal wall: inflamed      Oropharynx: inflammation      Oropharynx: no stenosis and no lesion      Vallecula: inflammation      Vallecula: no lesion      Base of tongue: lingual tonsillar hypertrophy and inflammation      Base of tongue: no lesion       comment:  Tip of epiglottis contacts base of tongue    Epiglottis: inflammation      Epiglottis: not retroflexed    Larynx/ Hypopharynx:     Arytenoids: inflammation and interarytenoid edema       comment:  Left side cuneiform cartilage slightly more proximal than the right side    Hypopharynx: inflammation      Hypopharynx: no lesions      Pyriform sinus: inflammation      Pyriform sinus: no lesion and no pooling of secretions      False vocal cords: inflammation      False vocal cords: no lesion      True vocal cords: Yuan's edema      True vocal cords: no immobility    Post-procedure details:     Patient tolerance of procedure:  Tolerated well  Comments:      Mild motion from the nasopharynx down to the hypopharynx, including the larynx and supraglottis  No evidence of lesions  Left cuneiform cartilage is slightly anterior to the right side with mild asymmetry of arytenoid closure posteriorly  True vocal folds appear without lesions     Result Review        RESULTS REVIEW    I have reviewed the patients old records in the chart.   I have reviewed the patients old records in the chart.  The following results/records were reviewed:  I reviewed the patient's new imaging results and agree with the interpretation.  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 Automated exposure control was also  utilized to decrease patient radiation dose.    Technique: Axial CT of the neck after the uneventful administration of  Isovue iodinated contrast material. Sagittal and coronal reformations  were also provided for review.    Comparison: None    Findings:    Included intracranial contents are unremarkable. Orbital contents are  unremarkable. Included portions of the paranasal sinuses and mastoids  are clear. Major salivary glands are unremarkable. No mucosal space  lesion identified. Bilateral tonsilloliths. Vallecula and epiglottis are  unremarkable. Slight asymmetry in the piriform sinuses, larger on the  left. However, I do not see any obvious CT evidence for vocal cord  dysfunction. Aryepiglottic fold does appear slightly more thick on the  right although I do not see any mucosal lesion. Normal appearance of the  arytenoid cartilages, normally positioned and without evidence of  erosion or sclerosis. No glottic mass identified. Trachea is  unremarkable.no oral cavity enhancing mass identified. Floor of mouth  soft tissue structures are unremarkable. There are no inflammatory  changes in the deep neck.  No suspicious cervical adenopathy. Previous  hemithyroidectomy on the left. Right lobe nodules measuring up to 1.6 cm  in diameter. Cervical vascular structures  are patent. Lung apices are  clear. Reversed cervical lordosis. No acute bony abnormality.    Impression  Impression:    1. No visualized soft tissue mass or suspicious adenopathy.  2. Asymmetric size of the piriform sinuses (left greater than right) and  there does appear to be some asymmetric thickening of the right  aryepiglottic fold relative to the left; however, I do not see any  enhancing mass or any strong evidence for vocal cord dysfunction.  3. Arytenoid cartilages appear normal by CT, normally positioned and  without evidence of erosion or sclerosis.  4. Previous left hemithyroidectomy. Multiple right lobe nodules with the  largest measuring up to 1.6 cm in diameter. This would meet criteria for  dedicated ultrasound assessment if not previously performed.    This report was signed and finalized on 11/1/2024 12:21 PM by Dr Ren Londono.     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  Patient has had no real change in her globus sensation.  The tip of her epiglottis does contact her lingual tonsils.  This may be the source of the problem.  I see no other indications of malignancy or masses in her hypopharynx and larynx.  I will continue to follow this.  The patient continues with mildly asymmetric arytenoid and aryepiglottic folds.  I see no evidence of lesion that requires biopsy at this point in time.  Thyroid nodule-patient found with a thyroid nodule.  This is small has been evaluated in the past.  I will plan follow-up ultrasound in approximately 7 months,  a year after the CT scan was obtained.  Thyroid ultrasound in 7 months  Flonase twice daily  Nasal saline  Reflux precautions  Call for problems    My Chart:  Patient is using My Chart    Patient understand(s) and agree(s) with the treatment plan as described.      Return in about 7 months (around 11/2/2025) for Recheck swallowing and thyroid U/S.        Electronically signed by Fab Hirsch Jr, MD 04/02/25 10:05 AM CDT.

## (undated) DEVICE — SPNG GZ WOVN 4X4IN 12PLY 10/BX STRL

## (undated) DEVICE — DISPOSABLE TOURNIQUET CUFF SINGLE BLADDER, DUAL PORT AND QUICK CONNECT CONNECTOR: Brand: COLOR CUFF

## (undated) DEVICE — GLV SURG SENSICARE POLYISPRN W/ALOE PF LF 6.5 GRN STRL

## (undated) DEVICE — BNDG ELAS ELITE V/CLOSE 6IN 5YD LF STRL

## (undated) DEVICE — STERILE POLYISOPRENE POWDER-FREE SURGICAL GLOVES WITH EMOLLIENT COATING: Brand: PROTEXIS

## (undated) DEVICE — POSTN SURG ARTHSCP KN HLDR

## (undated) DEVICE — BLD SHAVER RESEC SABRE COOLCUT 5MM 13CM

## (undated) DEVICE — GLV SURG SIGNATURE ESSENTIAL PF LTX SZ8

## (undated) DEVICE — SOL IRR LACT RNG BG 3000ML

## (undated) DEVICE — SUT ETHLN 3-0 FS118IN 663H

## (undated) DEVICE — GLV SURG SENSICARE PI ORTHO PF SZ7 LF STRL

## (undated) DEVICE — BLD DISSCT COOL CUT SJ CRVD 4MM 13CM

## (undated) DEVICE — PK KN ARTHSCP LF 60

## (undated) DEVICE — GOWN,NON-REINFORCED,SIRUS,SET IN SLV,XL: Brand: MEDLINE

## (undated) DEVICE — GLV SURG SENSICARE PI LF PF 7.5 GRN STRL

## (undated) DEVICE — TBG PUMP ARTHSCP MAIN AR6400 16FT

## (undated) DEVICE — GOWN,PREVENTION PLUS,XLONG/XLARGE,STRL: Brand: MEDLINE

## (undated) DEVICE — DRSNG GZ CURAD XEROFORM NONADHS 5X9IN STRL

## (undated) DEVICE — GLV SURG TRIUMPH PF LTX 7 STRL